# Patient Record
Sex: MALE | Race: WHITE | Employment: OTHER | URBAN - METROPOLITAN AREA
[De-identification: names, ages, dates, MRNs, and addresses within clinical notes are randomized per-mention and may not be internally consistent; named-entity substitution may affect disease eponyms.]

---

## 2018-03-03 PROCEDURE — 82607 VITAMIN B-12: CPT | Performed by: INTERNAL MEDICINE

## 2019-02-14 ENCOUNTER — HOSPITAL ENCOUNTER (OUTPATIENT)
Facility: HOSPITAL | Age: 84
Setting detail: HOSPITAL OUTPATIENT SURGERY
Discharge: HOME | End: 2019-02-14
Attending: OPHTHALMOLOGY | Admitting: OPHTHALMOLOGY
Payer: MEDICARE

## 2019-02-14 VITALS
BODY MASS INDEX: 25.41 KG/M2 | DIASTOLIC BLOOD PRESSURE: 86 MMHG | OXYGEN SATURATION: 99 % | TEMPERATURE: 98 F | SYSTOLIC BLOOD PRESSURE: 147 MMHG | RESPIRATION RATE: 14 BRPM | HEIGHT: 70 IN | HEART RATE: 60 BPM | WEIGHT: 177.5 LBS

## 2019-02-14 DIAGNOSIS — H25.811 COMBINED FORMS OF AGE-RELATED CATARACT OF RIGHT EYE: ICD-10-CM

## 2019-02-14 PROCEDURE — 08RJ3JZ REPLACEMENT OF RIGHT LENS WITH SYNTHETIC SUBSTITUTE, PERCUTANEOUS APPROACH: ICD-10-PCS | Performed by: OPHTHALMOLOGY

## 2019-02-14 RX ORDER — LIDOCAINE HYDROCHLORIDE 10 MG/ML
INJECTION, SOLUTION EPIDURAL; INFILTRATION; INTRACAUDAL; PERINEURAL AS NEEDED
Status: DISCONTINUED | OUTPATIENT
Start: 2019-02-14 | End: 2019-02-14 | Stop reason: HOSPADM

## 2019-02-14 RX ORDER — PHENYLEPHRINE HCL 2.5 %
1 DROPS OPHTHALMIC (EYE) SEE ADMIN INSTRUCTIONS
Status: COMPLETED | OUTPATIENT
Start: 2019-02-14 | End: 2019-02-14

## 2019-02-14 RX ORDER — ONDANSETRON 2 MG/ML
4 INJECTION INTRAMUSCULAR; INTRAVENOUS AS NEEDED
Status: DISCONTINUED | OUTPATIENT
Start: 2019-02-14 | End: 2019-02-14

## 2019-02-14 RX ORDER — LABETALOL HYDROCHLORIDE 5 MG/ML
5 INJECTION, SOLUTION INTRAVENOUS EVERY 5 MIN PRN
Status: DISCONTINUED | OUTPATIENT
Start: 2019-02-14 | End: 2019-02-14

## 2019-02-14 RX ORDER — BALANCED SALT SOLUTION 6.4; .75; .48; .3; 3.9; 1.7 MG/ML; MG/ML; MG/ML; MG/ML; MG/ML; MG/ML
SOLUTION OPHTHALMIC AS NEEDED
Status: DISCONTINUED | OUTPATIENT
Start: 2019-02-14 | End: 2019-02-14 | Stop reason: HOSPADM

## 2019-02-14 RX ORDER — MOXIFLOXACIN 5 MG/ML
SOLUTION/ DROPS OPHTHALMIC AS NEEDED
Status: DISCONTINUED | OUTPATIENT
Start: 2019-02-14 | End: 2019-02-14 | Stop reason: HOSPADM

## 2019-02-14 RX ORDER — ACETAMINOPHEN 500 MG
500 TABLET ORAL ONCE AS NEEDED
Status: DISCONTINUED | OUTPATIENT
Start: 2019-02-14 | End: 2019-02-14

## 2019-02-14 RX ORDER — PREDNISOLONE ACETATE 10 MG/ML
SUSPENSION/ DROPS OPHTHALMIC AS NEEDED
Status: DISCONTINUED | OUTPATIENT
Start: 2019-02-14 | End: 2019-02-14 | Stop reason: HOSPADM

## 2019-02-14 RX ORDER — NALOXONE HYDROCHLORIDE 0.4 MG/ML
80 INJECTION, SOLUTION INTRAMUSCULAR; INTRAVENOUS; SUBCUTANEOUS AS NEEDED
Status: DISCONTINUED | OUTPATIENT
Start: 2019-02-14 | End: 2019-02-14

## 2019-02-14 RX ORDER — SODIUM CHLORIDE, SODIUM LACTATE, POTASSIUM CHLORIDE, CALCIUM CHLORIDE 600; 310; 30; 20 MG/100ML; MG/100ML; MG/100ML; MG/100ML
INJECTION, SOLUTION INTRAVENOUS CONTINUOUS
Status: DISCONTINUED | OUTPATIENT
Start: 2019-02-14 | End: 2019-02-14

## 2019-02-14 RX ORDER — METOCLOPRAMIDE HYDROCHLORIDE 5 MG/ML
10 INJECTION INTRAMUSCULAR; INTRAVENOUS AS NEEDED
Status: DISCONTINUED | OUTPATIENT
Start: 2019-02-14 | End: 2019-02-14

## 2019-02-14 RX ORDER — DIPHENHYDRAMINE HYDROCHLORIDE 50 MG/ML
12.5 INJECTION INTRAMUSCULAR; INTRAVENOUS AS NEEDED
Status: DISCONTINUED | OUTPATIENT
Start: 2019-02-14 | End: 2019-02-14

## 2019-02-14 RX ORDER — TROPICAMIDE 10 MG/ML
1 SOLUTION/ DROPS OPHTHALMIC SEE ADMIN INSTRUCTIONS
Status: COMPLETED | OUTPATIENT
Start: 2019-02-14 | End: 2019-02-14

## 2019-02-14 RX ORDER — ACETAMINOPHEN 500 MG
1000 TABLET ORAL ONCE
Status: DISCONTINUED | OUTPATIENT
Start: 2019-02-14 | End: 2019-02-14

## 2019-02-14 RX ORDER — HYDROCODONE BITARTRATE AND ACETAMINOPHEN 5; 325 MG/1; MG/1
1 TABLET ORAL AS NEEDED
Status: DISCONTINUED | OUTPATIENT
Start: 2019-02-14 | End: 2019-02-14

## 2019-02-14 RX ORDER — CYCLOPENTOLATE HYDROCHLORIDE 10 MG/ML
1 SOLUTION/ DROPS OPHTHALMIC SEE ADMIN INSTRUCTIONS
Status: COMPLETED | OUTPATIENT
Start: 2019-02-14 | End: 2019-02-14

## 2019-02-14 RX ORDER — TETRACAINE HYDROCHLORIDE 5 MG/ML
1 SOLUTION OPHTHALMIC SEE ADMIN INSTRUCTIONS
Status: COMPLETED | OUTPATIENT
Start: 2019-02-14 | End: 2019-02-14

## 2019-02-14 RX ORDER — ACETAMINOPHEN 500 MG
1000 TABLET ORAL EVERY 6 HOURS PRN
COMMUNITY

## 2019-02-14 RX ORDER — HYDROCODONE BITARTRATE AND ACETAMINOPHEN 5; 325 MG/1; MG/1
2 TABLET ORAL AS NEEDED
Status: DISCONTINUED | OUTPATIENT
Start: 2019-02-14 | End: 2019-02-14

## 2019-02-14 NOTE — H&P (VIEW-ONLY)
Reviewed below H&P by Dr. Villanueva Search. No changes.     Riaz Ernandez   2/5/2019 4:00 PM   Office Visit   MRN:  [id number]   Description: 80year old male Provider: Scotty Weems MD Department: [facility] Internal Med   Scanning Cover Sheet     Click to print [facility] Current Outpatient Medications:  LOSARTAN POTASSIUM-HCTZ 100-12.5 MG Oral Tab TAKE 1 TABLET BY MOUTH ONCE DAILY Disp: 90 tablet Rfl: 1   CARVEDILOL 25 MG Oral Tab TAKE 1 TABLET BY MOUTH TWICE DAILY WITH MEALS Disp: 180 tablet Rfl: 1   ofloxacin 0.3 % Ophth CHEST: normal resp effort, clear to auscultation, no rales, rhonchi or wheezes  CVS: RRR, normal S1 and S2, no murmurs, no S3/S4, no peripheral edema  GI: soft, non-tender, non-distended, no hepatomegaly, no masses           IMPRESSION / PLAN:   Diagnoses Additional Encounter Details     Scanning Cover Sheet   All Scans   Questionnaire Details   Referral Information   [facility]m Time Out   Administrative Information   Coding Query   SmartForms     No SmartForms are associated with this patient.    Infusion Orders

## 2019-02-14 NOTE — H&P
Reviewed below H&P by Dr. Jonathan Mcknight. No changes.     Emperatriz Alejandra   2/5/2019 4:00 PM   Office Visit   MRN:  [id number]   Description: 80year old male Provider: Eric Suarez MD Department: [facility] Internal Med   Scanning Cover Sheet     Click to print [facility] Current Outpatient Medications:  LOSARTAN POTASSIUM-HCTZ 100-12.5 MG Oral Tab TAKE 1 TABLET BY MOUTH ONCE DAILY Disp: 90 tablet Rfl: 1   CARVEDILOL 25 MG Oral Tab TAKE 1 TABLET BY MOUTH TWICE DAILY WITH MEALS Disp: 180 tablet Rfl: 1   ofloxacin 0.3 % Ophth CHEST: normal resp effort, clear to auscultation, no rales, rhonchi or wheezes  CVS: RRR, normal S1 and S2, no murmurs, no S3/S4, no peripheral edema  GI: soft, non-tender, non-distended, no hepatomegaly, no masses           IMPRESSION / PLAN:   Diagnoses Additional Encounter Details     Scanning Cover Sheet   All Scans   Questionnaire Details   Referral Information   [facility] Time Out   Administrative Information   Coding Query   SmartForms     No SmartForms are associated with this patient.    Infusion Orders

## 2019-02-15 ENCOUNTER — ANESTHESIA EVENT (OUTPATIENT)
Dept: SURGERY | Facility: HOSPITAL | Age: 84
End: 2019-02-15
Payer: MEDICARE

## 2019-02-19 NOTE — OPERATIVE REPORT
[facility] SURGICAL CENTER OPERATIVE REPORT:     PATIENT NAME:  Omar Kerr    MRN:  [id number]    DATE OF OPERATION:       2/14/2019      PREOPERATIVE DIAGNOSIS:   1. Cortical Cataract, OD  2.  Nuclear Sclerotic Cataract, OD  Posterior Subcapsular Cataract, OD    PO Hydrodissection and hydrodelineation were carried out using balanced salt solution on a cannula. The phacoemulsification tip was introduced into the eye, and the nucleus was removed with a modified divide and conquer technique.  The residual cortex was mallory

## 2019-02-28 ENCOUNTER — ANESTHESIA (OUTPATIENT)
Dept: SURGERY | Facility: HOSPITAL | Age: 84
End: 2019-02-28
Payer: MEDICARE

## 2019-02-28 ENCOUNTER — HOSPITAL ENCOUNTER (OUTPATIENT)
Facility: HOSPITAL | Age: 84
Setting detail: HOSPITAL OUTPATIENT SURGERY
Discharge: HOME | End: 2019-02-28
Attending: OPHTHALMOLOGY | Admitting: OPHTHALMOLOGY
Payer: MEDICARE

## 2019-02-28 VITALS
HEIGHT: 70 IN | SYSTOLIC BLOOD PRESSURE: 142 MMHG | RESPIRATION RATE: 16 BRPM | DIASTOLIC BLOOD PRESSURE: 62 MMHG | WEIGHT: 176.56 LBS | HEART RATE: 78 BPM | TEMPERATURE: 98 F | BODY MASS INDEX: 25.28 KG/M2 | OXYGEN SATURATION: 97 %

## 2019-02-28 DIAGNOSIS — H25.812 COMBINED FORMS OF AGE-RELATED CATARACT OF LEFT EYE: ICD-10-CM

## 2019-02-28 PROCEDURE — 08RK3JZ REPLACEMENT OF LEFT LENS WITH SYNTHETIC SUBSTITUTE, PERCUTANEOUS APPROACH: ICD-10-PCS | Performed by: OPHTHALMOLOGY

## 2019-02-28 RX ORDER — ACETAMINOPHEN 500 MG
1000 TABLET ORAL ONCE
Status: DISCONTINUED | OUTPATIENT
Start: 2019-02-28 | End: 2019-02-28

## 2019-02-28 RX ORDER — CYCLOPENTOLATE HYDROCHLORIDE 10 MG/ML
1 SOLUTION/ DROPS OPHTHALMIC SEE ADMIN INSTRUCTIONS
Status: COMPLETED | OUTPATIENT
Start: 2019-02-28 | End: 2019-02-28

## 2019-02-28 RX ORDER — LIDOCAINE HYDROCHLORIDE 10 MG/ML
INJECTION, SOLUTION EPIDURAL; INFILTRATION; INTRACAUDAL; PERINEURAL AS NEEDED
Status: DISCONTINUED | OUTPATIENT
Start: 2019-02-28 | End: 2019-02-28

## 2019-02-28 RX ORDER — TROPICAMIDE 10 MG/ML
1 SOLUTION/ DROPS OPHTHALMIC SEE ADMIN INSTRUCTIONS
Status: COMPLETED | OUTPATIENT
Start: 2019-02-28 | End: 2019-02-28

## 2019-02-28 RX ORDER — PHENYLEPHRINE HCL 2.5 %
DROPS OPHTHALMIC (EYE)
Status: DISCONTINUED
Start: 2019-02-28 | End: 2019-02-28

## 2019-02-28 RX ORDER — SODIUM CHLORIDE, SODIUM LACTATE, POTASSIUM CHLORIDE, CALCIUM CHLORIDE 600; 310; 30; 20 MG/100ML; MG/100ML; MG/100ML; MG/100ML
INJECTION, SOLUTION INTRAVENOUS CONTINUOUS
Status: DISCONTINUED | OUTPATIENT
Start: 2019-02-28 | End: 2019-02-28

## 2019-02-28 RX ORDER — PREDNISOLONE ACETATE 10 MG/ML
SUSPENSION/ DROPS OPHTHALMIC AS NEEDED
Status: DISCONTINUED | OUTPATIENT
Start: 2019-02-28 | End: 2019-02-28

## 2019-02-28 RX ORDER — CYCLOPENTOLATE HYDROCHLORIDE 10 MG/ML
SOLUTION/ DROPS OPHTHALMIC
Status: DISCONTINUED
Start: 2019-02-28 | End: 2019-02-28

## 2019-02-28 RX ORDER — TETRACAINE HYDROCHLORIDE 5 MG/ML
SOLUTION OPHTHALMIC
Status: DISCONTINUED
Start: 2019-02-28 | End: 2019-02-28

## 2019-02-28 RX ORDER — IBUPROFEN 400 MG/1
400 TABLET ORAL EVERY 6 HOURS PRN
COMMUNITY

## 2019-02-28 RX ORDER — MOXIFLOXACIN 5 MG/ML
SOLUTION/ DROPS OPHTHALMIC AS NEEDED
Status: DISCONTINUED | OUTPATIENT
Start: 2019-02-28 | End: 2019-02-28

## 2019-02-28 RX ORDER — PHENYLEPHRINE HCL 2.5 %
1 DROPS OPHTHALMIC (EYE) SEE ADMIN INSTRUCTIONS
Status: COMPLETED | OUTPATIENT
Start: 2019-02-28 | End: 2019-02-28

## 2019-02-28 RX ORDER — BALANCED SALT SOLUTION 6.4; .75; .48; .3; 3.9; 1.7 MG/ML; MG/ML; MG/ML; MG/ML; MG/ML; MG/ML
SOLUTION OPHTHALMIC AS NEEDED
Status: DISCONTINUED | OUTPATIENT
Start: 2019-02-28 | End: 2019-02-28

## 2019-02-28 RX ORDER — TROPICAMIDE 10 MG/ML
SOLUTION/ DROPS OPHTHALMIC
Status: DISCONTINUED
Start: 2019-02-28 | End: 2019-02-28

## 2019-02-28 RX ORDER — TETRACAINE HYDROCHLORIDE 5 MG/ML
SOLUTION OPHTHALMIC AS NEEDED
Status: DISCONTINUED | OUTPATIENT
Start: 2019-02-28 | End: 2019-02-28

## 2019-02-28 RX ORDER — TETRACAINE HYDROCHLORIDE 5 MG/ML
1 SOLUTION OPHTHALMIC SEE ADMIN INSTRUCTIONS
Status: COMPLETED | OUTPATIENT
Start: 2019-02-28 | End: 2019-02-28

## 2019-02-28 NOTE — OPERATIVE REPORT
[facility] SURGICAL CENTER OPERATIVE REPORT:     PATIENT NAME:  Marcial Burgos    MRN:  [id number]    DATE OF OPERATION:       2/28/2019      PREOPERATIVE DIAGNOSIS:   1. Cortical Cataract, OS  2.  Nuclear Sclerotic Cataract, OS  Posterior Subcapsular Cataract, OS    PO hydrodelineation were carried out using balanced salt solution on a cannula. The phacoemulsification tip was introduced into the eye, and the nucleus was removed with a modified divide and conquer technique.  The residual cortex was removed using automated

## 2019-02-28 NOTE — ANESTHESIA POSTPROCEDURE EVALUATION
[address] Patient Status:  Hospital Outpatient Surgery   Age/Gender 80year old male MRN [id number]   [facility] SURGERY Attending Terell Lu MD   Hosp Day # 0 PCP Rosalba Mares MD       Anesthesia Post-op Note    Proc

## 2019-02-28 NOTE — ANESTHESIA PREPROCEDURE EVALUATION
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  Disp:  Rfl:    [DISCONTINUED] LOSARTAN POTASSIUM-HCTZ 100-12.5 MG Oral Tab TAKE 1 TABLET BY MOUTH ONCE DAILY Disp: 90 tablet Rfl: 1   CARVEDILOL 25 MG Oral Tab TAKE 1 TABLET BY MOUTH TWICE DAILY WITH MEALS D RDW 13.2 02/05/2019     02/05/2019     Lab Results   Component Value Date     02/05/2019    K 5.10 02/05/2019     02/05/2019    CO2 22.8 02/05/2019    BUN 44 (H) 02/05/2019    CREATSERUM 1.85 (H) 02/05/2019     (H) 02/05/2019

## 2019-02-28 NOTE — INTERVAL H&P NOTE
Pre-op Diagnosis: Combined forms of age-related cataract of left eye [H25.812]    The above referenced H&P was reviewed by Pierre Ruiz MD on 2/28/2019, the patient was examined and no significant changes have occurred in the patient's condition since th

## 2019-07-30 PROBLEM — N18.30 CKD (CHRONIC KIDNEY DISEASE) STAGE 3, GFR 30-59 ML/MIN (HCC): Status: ACTIVE | Noted: 2019-07-30

## 2019-07-30 PROBLEM — E11.9 TYPE 2 DIABETES MELLITUS, WITHOUT LONG-TERM CURRENT USE OF INSULIN (HCC): Status: ACTIVE | Noted: 2019-07-30

## 2024-02-16 ENCOUNTER — APPOINTMENT (OUTPATIENT)
Dept: MRI IMAGING | Facility: HOSPITAL | Age: 89
End: 2024-02-16
Attending: EMERGENCY MEDICINE
Payer: MEDICARE

## 2024-02-16 ENCOUNTER — APPOINTMENT (OUTPATIENT)
Dept: GENERAL RADIOLOGY | Facility: HOSPITAL | Age: 89
End: 2024-02-16
Attending: EMERGENCY MEDICINE
Payer: MEDICARE

## 2024-02-16 ENCOUNTER — HOSPITAL ENCOUNTER (EMERGENCY)
Facility: HOSPITAL | Age: 89
Discharge: HOME | End: 2024-02-16
Attending: EMERGENCY MEDICINE
Payer: MEDICARE

## 2024-02-16 VITALS
WEIGHT: 152 LBS | RESPIRATION RATE: 16 BRPM | HEART RATE: 47 BPM | TEMPERATURE: 97 F | BODY MASS INDEX: 21.76 KG/M2 | OXYGEN SATURATION: 96 % | SYSTOLIC BLOOD PRESSURE: 165 MMHG | HEIGHT: 70 IN | DIASTOLIC BLOOD PRESSURE: 60 MMHG

## 2024-02-16 DIAGNOSIS — H54.61 VISION LOSS OF RIGHT EYE: Primary | ICD-10-CM

## 2024-02-16 DIAGNOSIS — G45.9 BRAIN TIA: ICD-10-CM

## 2024-02-16 PROBLEM — E87.20 METABOLIC ACIDOSIS: Status: ACTIVE | Noted: 2024-02-16

## 2024-02-16 PROBLEM — R73.9 HYPERGLYCEMIA: Status: ACTIVE | Noted: 2024-02-16

## 2024-02-16 PROBLEM — R79.89 AZOTEMIA: Status: ACTIVE | Noted: 2024-02-16

## 2024-02-16 LAB
ALBUMIN SERPL-MCNC: 3.8 G/DL (ref 3.4–5)
ALBUMIN/GLOB SERPL: 1 {RATIO} (ref 1–2)
ALP LIVER SERPL-CCNC: 61 U/L
ALT SERPL-CCNC: 12 U/L
ANION GAP SERPL CALC-SCNC: 4 MMOL/L (ref 0–18)
AST SERPL-CCNC: 16 U/L (ref 15–37)
BASOPHILS # BLD AUTO: 0.04 X10(3) UL (ref 0–0.2)
BASOPHILS NFR BLD AUTO: 0.6 %
BILIRUB SERPL-MCNC: 0.7 MG/DL (ref 0.1–2)
BUN BLD-MCNC: 41 MG/DL (ref 9–23)
CALCIUM BLD-MCNC: 9.5 MG/DL (ref 8.5–10.1)
CHLORIDE SERPL-SCNC: 112 MMOL/L (ref 98–112)
CO2 SERPL-SCNC: 21 MMOL/L (ref 21–32)
CREAT BLD-MCNC: 1.38 MG/DL
CRP SERPL-MCNC: 3.64 MG/DL (ref ?–0.3)
EGFRCR SERPLBLD CKD-EPI 2021: 48 ML/MIN/1.73M2 (ref 60–?)
EOSINOPHIL # BLD AUTO: 0.16 X10(3) UL (ref 0–0.7)
EOSINOPHIL NFR BLD AUTO: 2.2 %
ERYTHROCYTE [DISTWIDTH] IN BLOOD BY AUTOMATED COUNT: 13 %
ERYTHROCYTE [SEDIMENTATION RATE] IN BLOOD: 40 MM/HR
GLOBULIN PLAS-MCNC: 3.9 G/DL (ref 2.8–4.4)
GLUCOSE BLD-MCNC: 113 MG/DL (ref 70–99)
HCT VFR BLD AUTO: 36 %
HGB BLD-MCNC: 12 G/DL
IMM GRANULOCYTES # BLD AUTO: 0.01 X10(3) UL (ref 0–1)
IMM GRANULOCYTES NFR BLD: 0.1 %
LYMPHOCYTES # BLD AUTO: 2.18 X10(3) UL (ref 1–4)
LYMPHOCYTES NFR BLD AUTO: 30 %
MCH RBC QN AUTO: 30.1 PG (ref 26–34)
MCHC RBC AUTO-ENTMCNC: 33.3 G/DL (ref 31–37)
MCV RBC AUTO: 90.2 FL
MONOCYTES # BLD AUTO: 0.59 X10(3) UL (ref 0.1–1)
MONOCYTES NFR BLD AUTO: 8.1 %
NEUTROPHILS # BLD AUTO: 4.29 X10 (3) UL (ref 1.5–7.7)
NEUTROPHILS # BLD AUTO: 4.29 X10(3) UL (ref 1.5–7.7)
NEUTROPHILS NFR BLD AUTO: 59 %
OSMOLALITY SERPL CALC.SUM OF ELEC: 295 MOSM/KG (ref 275–295)
PLATELET # BLD AUTO: 223 10(3)UL (ref 150–450)
POTASSIUM SERPL-SCNC: 4.4 MMOL/L (ref 3.5–5.1)
PROT SERPL-MCNC: 7.7 G/DL (ref 6.4–8.2)
RBC # BLD AUTO: 3.99 X10(6)UL
SODIUM SERPL-SCNC: 137 MMOL/L (ref 136–145)
WBC # BLD AUTO: 7.3 X10(3) UL (ref 4–11)

## 2024-02-16 PROCEDURE — A9575 INJ GADOTERATE MEGLUMI 0.1ML: HCPCS | Performed by: EMERGENCY MEDICINE

## 2024-02-16 PROCEDURE — 99285 EMERGENCY DEPT VISIT HI MDM: CPT

## 2024-02-16 PROCEDURE — 70553 MRI BRAIN STEM W/O & W/DYE: CPT | Performed by: EMERGENCY MEDICINE

## 2024-02-16 PROCEDURE — 85025 COMPLETE CBC W/AUTO DIFF WBC: CPT | Performed by: EMERGENCY MEDICINE

## 2024-02-16 PROCEDURE — 70549 MR ANGIOGRAPH NECK W/O&W/DYE: CPT | Performed by: EMERGENCY MEDICINE

## 2024-02-16 PROCEDURE — 86140 C-REACTIVE PROTEIN: CPT | Performed by: EMERGENCY MEDICINE

## 2024-02-16 PROCEDURE — 71045 X-RAY EXAM CHEST 1 VIEW: CPT | Performed by: EMERGENCY MEDICINE

## 2024-02-16 PROCEDURE — 70546 MR ANGIOGRAPH HEAD W/O&W/DYE: CPT | Performed by: EMERGENCY MEDICINE

## 2024-02-16 PROCEDURE — 93010 ELECTROCARDIOGRAM REPORT: CPT

## 2024-02-16 PROCEDURE — 80053 COMPREHEN METABOLIC PANEL: CPT | Performed by: EMERGENCY MEDICINE

## 2024-02-16 PROCEDURE — 36415 COLL VENOUS BLD VENIPUNCTURE: CPT

## 2024-02-16 PROCEDURE — 85652 RBC SED RATE AUTOMATED: CPT | Performed by: EMERGENCY MEDICINE

## 2024-02-16 PROCEDURE — 93005 ELECTROCARDIOGRAM TRACING: CPT

## 2024-02-16 PROCEDURE — 99284 EMERGENCY DEPT VISIT MOD MDM: CPT

## 2024-02-16 RX ORDER — PREDNISONE 20 MG/1
40 TABLET ORAL DAILY
Qty: 14 TABLET | Refills: 0 | Status: SHIPPED | OUTPATIENT
Start: 2024-02-16 | End: 2024-02-23

## 2024-02-16 RX ORDER — PREDNISONE 20 MG/1
60 TABLET ORAL DAILY
Qty: 15 TABLET | Refills: 0 | Status: SHIPPED | OUTPATIENT
Start: 2024-02-16 | End: 2024-02-16

## 2024-02-16 RX ORDER — PREDNISONE 20 MG/1
60 TABLET ORAL ONCE
Status: DISCONTINUED | OUTPATIENT
Start: 2024-02-16 | End: 2024-02-16

## 2024-02-16 RX ORDER — PREDNISONE 20 MG/1
40 TABLET ORAL ONCE
Status: COMPLETED | OUTPATIENT
Start: 2024-02-16 | End: 2024-02-16

## 2024-02-16 RX ORDER — GADOTERATE MEGLUMINE 376.9 MG/ML
15 INJECTION INTRAVENOUS
Status: COMPLETED | OUTPATIENT
Start: 2024-02-16 | End: 2024-02-16

## 2024-02-16 RX ORDER — CEFDINIR 300 MG/1
300 CAPSULE ORAL 2 TIMES DAILY
Qty: 14 CAPSULE | Refills: 0 | Status: SHIPPED | OUTPATIENT
Start: 2024-02-16 | End: 2024-02-23

## 2024-02-16 RX ORDER — ASPIRIN 81 MG/1
81 TABLET, CHEWABLE ORAL ONCE
Status: COMPLETED | OUTPATIENT
Start: 2024-02-16 | End: 2024-02-16

## 2024-02-16 RX ORDER — AZITHROMYCIN 250 MG/1
TABLET, FILM COATED ORAL
Qty: 6 TABLET | Refills: 0 | Status: SHIPPED | OUTPATIENT
Start: 2024-02-16 | End: 2024-02-21

## 2024-02-16 NOTE — ED INITIAL ASSESSMENT (HPI)
Son reports patient with right eye vision loss since 02/15/2024 ar 9 AM. Denies arm or leg weakness/ numbness. Sent by opthalmologist today. Patient ambulatory with cane, only speaks Sami.

## 2024-02-16 NOTE — ED PROVIDER NOTES
Patient Seen in: [facility] Emergency Department      History     Chief Complaint   Patient presents with    Stroke    Vision Problem     Stated Complaint: loss of vision on 2/15/24 he woke up fine and then about 0900 lost vision in ri*    Subjective:   HPI    This is a 89-year-old male who presents with complaints of vision loss since 9 AM on February 15, 2024.  The patient has no other complaints of trouble speaking or trouble walking the son is here they went to an ophthalmologist today who actually saw him and did want to have him to be worked up for possible stroke.  And possible retinal artery occlusion.  Patient has no complaints of headache, chest pain, abdominal pain, numbness, weakness, trouble speaking.  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 No pertinent surgical history.             Social History     Socioeconomic History    Marital status:    Tobacco Use    Smoking status: Former    Smokeless tobacco: Never   Vaping Use    Vaping Use: Never used   Substance and Sexual Activity    Alcohol use: No    Drug use: No              Review of Systems    Positive for stated complaint: loss of vision on 2/15/24 he woke up fine and then about 0900 lost vision in ri*  Other systems are as noted in HPI.  Constitutional and vital signs reviewed.      All other systems reviewed and negative except as noted above.    Physical Exam     ED Triage Vitals [02/16/24 1523]   /63   Pulse 59   Resp 16   Temp 97 °F (36.1 °C)   Temp src Temporal   SpO2 97 %   O2 Device None (Room air)       Current:BP (!) 165/60   Pulse (!) 47   Temp 97 °F (36.1 °C) (Temporal)   Resp 16   Ht 177.8 cm (5' 10\")   Wt 68.9 kg   SpO2 96%   BMI 21.81 kg/m²         Physical Exam  General: .    The patient is in no  respiratory distress. The patient is not septic or toxic    HEENT: Atraumatic, conjunctiva are not pale.  There is no icterus.  Oral mucosa Is wet. The neck is supple. There is no meningismus.  There is no facial asymmetry.  Cannot see anything out of his right eye even light is very fuzzy.  LUNGS: Clear to auscultation, there is no wheezing or retraction.  No crackles.    CV: Cardiovascular is regular without murmurs or rubs.    ABD: The abdomen is soft nondistended nontender.  There is no rebound.  There is no guarding.  Bowel sounds are present.    EXT: There is good pulses bilaterally.  There is no calf tenderness.  There is no rash noted.  There is no edema    NEURO: Alert and oriented x3.      Cranial nerves are grossly intact.   The pupils are dilated by the ophthalmologist.   Extraocular muscles are intact.      Muscle strength is 5 out of 5.    Sensory exam is grossly normal bilaterally upper and lower extremity    There is no pronator drift.    There is normal finger to nose bilaterally.      Patient is in no respiratory distress.       ED Course     Labs Reviewed   COMP METABOLIC PANEL (14) - Abnormal; Notable for the following components:       Result Value    Glucose 113 (*)     BUN 41 (*)     Creatinine 1.38 (*)     eGFR-Cr 48 (*)     ALT 12 (*)     All other components within normal limits   C-REACTIVE PROTEIN - Abnormal; Notable for the following components:    C-Reactive Protein 3.64 (*)     All other components within normal limits   SED RATE, WESTERGREN (AUTOMATED) - Abnormal; Notable for the following components:    Sed Rate 40 (*)     All other components within normal limits   CBC W/ DIFFERENTIAL - Abnormal; Notable for the following components:    HGB 12.0 (*)     HCT 36.0 (*)     All other components within normal limits   CBC WITH DIFFERENTIAL WITH PLATELET    Narrative:     The following orders were created for panel order CBC With Differential With Platelet.  Procedure                                Abnormality         Status                     ---------                               -----------         ------                     CBC W/ DIFFERENTIAL[[id number]]          Abnormal            Final result                 Please view results for these tests on the individual orders.   RAINBOW DRAW BLUE             The patient was placed on monitors, IV was started, blood was drawn.  ED Course as of 02/16/24 2129  ------------------------------------------------------------  Time: 02/16 1915  Value: C-REACTIVE PROTEIN(!): 3.64  Comment: (Reviewed)  ------------------------------------------------------------  Time: 02/16 1915  Value: SED RATE(!): 40  Comment: (Reviewed)     The patient was sent over for further evaluation.  To rule out any intracranial bleed, mass, possible retinal artery occlusion,         MDM      The EKG shows sinus bradycardia with PVCs noted.  There is no acute ST elevations or ischemic findings.  The rest of the EKG including rate rhythm axis and intervals I agree with the EKG report . The rate is 58      The patient CBC was grossly negative, comprehensive shows some mild renal insufficiency.  The patient's sed rate was 40 not significantly elevated CRP was elevated.    I personally reviewed the radiographs and my individual interpretation shows    Chest x-ray shows a left-sided pleural effusion.    Brain shows no acute bleed, tumor.  Also reviewed official report and it shows      MRI BRAIN MRA HEAD+MRA NECK (ALL W+WO) (CPT=70553/07077/75588)    Result Date: 2/16/2024  PROCEDURE:  MRI BRAIN MRA HEAD+MRA NECK (ALL W+WO) (CPT=70553/94154/55006)  COMPARISON:  None.  INDICATIONS:  eval CVA  TECHNIQUE:  MRI of the brain was performed with multi-planar T1, T2-weighted images with FLAIR sequences and diffusion weighted images without and with infusion.  MR angiography of the brain without and with infusion and MR angiography of the neck without and with infusion was performed using 3D time  of flight, multi-planar and 3D reconstructed images. All measurements obtained in this exam were performed using NASCET criteria.  PATIENT STATED HISTORY:(As transcribed by Technologist)  Patient complains of right eye vision loss since yesterday.   CONTRAST USED:  14 mL of Dotarem  FINDINGS:  MRI BRAIN INTRACRANIAL:  Exam is degraded by motion.  There is no restricted diffusion to suggest acute intracranial infarction.  There is a few scattered foci of increased FLAIR and T2 signal in the periventricular white matter both cerebral hemispheres consistent with chronic small vessel ischemic changes of aging.  No abnormal intracranial enhancement.  No mass effect.  No midline shift. VENTRICLES/SULCI:  Diffuse cerebral and cerebellar atrophy.  SINUSES/ORBITS:       Mucosal thickening in the ethmoid air cells bilaterally and maxillary sinuses.  MASTOIDS:       The mastoids are clear.  MRA BRAIN INTRACRANIAL CAROTIDS:         There is some atheromatous plaque involving the cavernous portions of internal carotid arteries bilaterally.  No significant stenosis or aneurysm. ANTERIOR CEREBRALS:         No significant stenosis or aneurysm. ANTERIOR COMMUNICATING:  No significant stenosis or aneurysm. MIDDLE CEREBRALS:         No significant stenosis or aneurysm. POSTERIOR COMMUNICATING: Not well visualized. BASILAR:             No visible stenosis or aneurysm. INTRACRANIAL VERTEBRALS: No significant stenosis or dissection.  MRA NECK COMMON CAROTID:                 No significant stenosis or dissection. CERVICAL INTERNAL CAROTID:  No significant stenosis or dissection. CERVICAL VERTEBRAL:               No significant stenosis or dissection.            CONCLUSION:  There is no MR evidence for acute intracranial infarction.  Diffuse cerebral and cerebellar atrophy with chronic microvascular ischemic changes of aging.  MRA is limited by motion.  There is no gross evidence for significant stenosis, large vessel occlusion or carotid  or vertebral artery dissection.   LOCATION:  [id number]   Dictated by (CST): Allegra Quevedo MD on 2/16/2024 at 6:52 PM     Finalized by (CST): Allegra Quevedo MD on 2/16/2024 at 6:57 PM       XR CHEST AP PORTABLE  (CPT=71045)    Result Date: 2/16/2024  PROCEDURE:  XR CHEST AP PORTABLE  (CPT=71045)  TECHNIQUE:  AP chest radiograph was obtained.  COMPARISON:  None.  INDICATIONS:  loss of vision on 2/15/24 he woke up fine and then about 0900 lost vision in right eye. Was at the Eye doctor today and sent in for stroke work up.  PATIENT STATED HISTORY: (As transcribed by Technologist)  Patient offered no additional history at this time.              CONCLUSION:  Enlargement of the cardiac silhouette.  Mild pulmonary venous congestion.  Partially loculated effusion in the left lung base with airspace disease retrocardiac left lower lobe.   LOCATION:  [id number]      Dictated by (CST): Allegra Quevedo MD on 2/16/2024 at 5:08 PM     Finalized by (CST): Allegra Quevedo MD on 2/16/2024 at 5:09 PM                 The patient CBC shows a white count of 7.3 hemoglobin 12 the patient's comprehensive 41.  Creatinine of 1.38.  The patient was given oral fluids patient was able to drink fluids.  The patient has no complaints of chest pain or shortness of breath.  He is here primarily because of the vision changes.       He is not hypoxic he is not having any chest pain.  Dissection was patient's sed rate, CRP was elevated.  Because of the elevated sed rate I discussed this case with the patient case with Dr. Rojas ophthalmology on-call I did try to call [facility] but they are not on-call.  He recommended that the patient get prednisone.  And did recommend the patient will eventually need to get a temporal artery biopsy I did feel temporal arteries on both sides he was not tender he had no jaw pain no temporal artery pain or jaw or neck pain or dizziness.  He did have good pulses there and he has no tenderness over the temporal  artery.  Discussed this case with Dr. Rojas he recommends starting on prednisone.   I talked to the family I discussed we can admit the patient.  But they feel comfortable going home but will empirically start him on antibiotics and the  MRI did show what seems to be sinusitis.,  The chest x-ray may be an early pneumonia although there is no fever no white count.  But the sed rate and CRP being elevated would be reasonable to start him on antibiotics I discussed this case with Dr. Rojas.  Also discussed this case with Dr. Perdomo who recommended patient be followed up with the TIA clinic or with our office.  Also recommend giving 1 baby aspirin.  Discussed this also this case with Dr. Neves who is covering for his physician Dr. Galarza..  The patient's case was discussed with patient's family and the patient himself patient want did not want to go to be admitted he want to go home.  He want to go home he did not want to be admitted he felt comfortable he had no chest pain shortness of breath dizziness lightheadedness 1 he was comfortable going home.  I did do a quick ultrasound a limited ultrasound of the orbits there is no findings of any obvious retinal detachment.  He is neurovascular intact on repeat exam no other focal findings.    Medical Decision Making      Disposition and Plan     Clinical Impression:  1. Vision loss of right eye    2. Brain TIA         Disposition:  Discharge  2/16/2024  7:22 pm    Follow-up:  Isa Galarza MD  [address]  [address]  [place] [address] [phone]    Follow up in 2 day(s)      Cem Rojas MD  [address] [facility] [id number] [phone]    Follow up in 2 day(s)      Susan Georges MD  [address]  [address]  [place] [id number] [phone]    Follow up in 2 day(s)      Harshil Gutierrez MD  [address]   [facility] [address]  [facility] [id number] [phone]    Follow up in 2 day(s)            Medications Prescribed:  Discharge Medication  List as of 2/16/2024  7:35 PM        START taking these medications    Details   predniSONE 20 MG Oral Tab Take 2 tablets (40 mg total) by mouth daily for 7 days., Normal, Disp-14 tablet, R-0      cefdinir 300 MG Oral Cap Take 1 capsule (300 mg total) by mouth 2 (two) times daily for 7 days., Normal, Disp-14 capsule, R-0      azithromycin (ZITHROMAX Z-LILIA) 250 MG Oral Tab 500 mg once followed by 250 mg daily x 4 days, Normal, Disp-6 tablet, R-0

## 2024-02-17 NOTE — DISCHARGE INSTRUCTIONS
You need to follow-up with ophthalmology.,  Also with general surgery to have a temporal artery biopsy.  Follow-up with your primary care physician, take 1 baby aspirin daily.      Follow-up with your primary care physician          There is some nonspecific abnormality seen on your imaging studies.  Although that is not was causing your  symptoms but these findings need to be followed up with your primary care physician  You were seen in the emergency room in a limited time.  There is a possibility that although we do not see any acute process at this present time that things can change with time.  Is therefore imperative that you follow-up with primary care physician for close follow-up.  If there is any significant progression of your pain  or other symptoms you to return immediately to the emergency room.

## 2024-02-18 LAB
ATRIAL RATE: 58 BPM
P AXIS: 2 DEGREES
P-R INTERVAL: 144 MS
Q-T INTERVAL: 454 MS
QRS DURATION: 126 MS
QTC CALCULATION (BEZET): 445 MS
R AXIS: -25 DEGREES
T AXIS: 70 DEGREES
VENTRICULAR RATE: 58 BPM

## 2024-02-19 ENCOUNTER — TELEPHONE (OUTPATIENT)
Dept: NEUROLOGY | Facility: CLINIC | Age: 89
End: 2024-02-19

## 2024-02-19 NOTE — TELEPHONE ENCOUNTER
TIA CLINIC SCREENING    1. Date of ED visit/TIA diagnosis:  02/16/2024   Time of discharge from ED:  1938    2. Is patient currently admitted?  No   If YES - [facility]IA Clinic Appointment not required.      3. Does patient already see an [facility] neurologist?  No  Name:     (if YES - TIA Clinic Appointment not required.  Route message on to patient's neurologist)    4. Patient's current anti-platelet therapy:  81mg ASA    5. Patient's current statin therapy:  none    6. Has 2D Echo with bubble test been done?  No  Date:      7. Is [facility] Clinic Appointment indicated?  Yes

## 2024-02-29 ENCOUNTER — OFFICE VISIT (OUTPATIENT)
Dept: NEUROLOGY | Facility: CLINIC | Age: 89
End: 2024-02-29
Payer: MEDICARE

## 2024-02-29 ENCOUNTER — TELEPHONE (OUTPATIENT)
Dept: NEUROLOGY | Facility: CLINIC | Age: 89
End: 2024-02-29

## 2024-02-29 VITALS
WEIGHT: 143 LBS | HEART RATE: 50 BPM | SYSTOLIC BLOOD PRESSURE: 112 MMHG | BODY MASS INDEX: 21 KG/M2 | RESPIRATION RATE: 16 BRPM | DIASTOLIC BLOOD PRESSURE: 68 MMHG

## 2024-02-29 DIAGNOSIS — H34.231 RIGHT RETINAL ARTERY BRANCH OCCLUSION: Primary | ICD-10-CM

## 2024-02-29 DIAGNOSIS — R70.0 ESR RAISED: ICD-10-CM

## 2024-02-29 PROCEDURE — 99204 OFFICE O/P NEW MOD 45 MIN: CPT | Performed by: OTHER

## 2024-02-29 RX ORDER — ASPIRIN 81 MG/1
81 TABLET ORAL DAILY
COMMUNITY

## 2024-02-29 NOTE — PROGRESS NOTES
[facility] - [place]   Neurology    Hayden Mg Patient Status:  No patient class for patient encounter    1930 MRN [id number]   Location [facility], [facility] PCP Isa Galarza MD              HPI:   Hayden Mg is a(n) 89 year old male with history of cataracts, who presents at the request of Dr. Roe for evaluation of episode of R vision loss on 2/15/24. He saw ophtho and was sent to ED. Reportedly showed changes that suggested a clot in the right eye. Patient reports suddenly the whole R eye vision went dark. This persisted but a few days later the vision in the outer right eye started coming back. Ophtho diagnosed patient with retinal artery occlusion, and patient was sent to a retinal specialist. He just recently had a laser procedure to try to correct this vision loss. ESR was elevated but MRI suggested sinusitis, so antibiotics were prescribed, as well as a course of prednisone.  Patient was started on ASA 81mg. Patient denies headache. Denies jaw claudication, temple tenderness.     Pertinent imaging and laboratory work-up:   MRI MRA head and neck 24  INTRACRANIAL CAROTIDS: There is some atheromatous plaque involving the cavernous portions of internal carotid arteries bilaterally. No significant stenosis or aneurysm.   Summary  There is no MR evidence for acute intracranial infarction. Diffuse cerebral and cerebellar atrophy with chronic microvascular ischemic changes of aging. MRA is limited by motion. There is no gross evidence for significant stenosis, large vessel occlusion or carotid or vertebral artery dissection.       A1c- 6.4      Component      Latest Ref Rng 2024   C-REACTIVE PROTEIN      <0.30 mg/dL 3.64 (H)    SED RATE      0 - 20 mm/Hr 40 (H)       Past Medical History:  Past Medical History:   Diagnosis Date    BPH (benign prostatic hyperplasia)     Cataract     Diabetes (HCC)     Herpes encephalitis (HCC) 2016    HTN  (hypertension)     Visual impairment     glasses        Past Surgical History:  History reviewed. No pertinent surgical history.    Family History:  family history includes Hypertension in his brother and mother.      Social History:   reports that he has quit smoking. He has never used smokeless tobacco. He reports that he does not drink alcohol and does not use drugs.    Allergies:  Allergies   Allergen Reactions    Lisinopril Coughing       MEDICATIONS:    Current Outpatient Medications:     losartan 100 MG Oral Tab, Take 1 tablet (100 mg total) by mouth daily., Disp: 90 tablet, Rfl: 3    metFORMIN  MG Oral Tablet 24 Hr, Take 1 tablet (500 mg total) by mouth daily with breakfast., Disp: 90 tablet, Rfl: 3    carvedilol 25 MG Oral Tab, Take 1 tablet (25 mg total) by mouth 2 (two) times daily with meals., Disp: 180 tablet, Rfl: 3    tamsulosin 0.4 MG Oral Cap, Take 1 capsule (0.4 mg total) by mouth daily., Disp: 90 capsule, Rfl: 3    pantoprazole 40 MG Oral Tab EC, Take 1 tablet (40 mg total) by mouth daily., Disp: 90 tablet, Rfl: 3    OneTouch Delica Lancets 30G Does not apply Misc, 1 lancet by Finger stick route daily. Test Blood Sugar Once Daily. Non-Insulin Dependent Diabetes Type II. E11.9., Disp: 100 each, Rfl: 3    Glucose Blood (ONETOUCH ULTRA) In Vitro Strip, Test Blood Sugar Once Daily. Non-Insulin Dependent Diabetes Type II. E11.9., Disp: 100 each, Rfl: 3    Blood Glucose Monitoring Suppl (BLOOD GLUCOSE MONITOR SYSTEM) w/Device Does not apply Kit, 1 each by Does not apply route 2 (two) times daily. Test Blood Sugar Twice Daily. Non-Insulin Dependent Diabetes Type II. E11.9., Disp: 1 kit, Rfl: 0    aspirin 81 MG Oral Tab EC, Take 1 tablet (81 mg total) by mouth daily., Disp: , Rfl:     ibuprofen 400 MG Oral Tab, Take 400 mg by mouth every 6 (six) hours as needed for Pain. (Patient not taking: Reported on 10/7/2021), Disp: , Rfl:     acetaminophen 500 MG Oral Tab, Take 1,000 mg by mouth every 6 (six)  hours as needed for Pain. (Patient not taking: Reported on 10/7/2021), Disp: , Rfl:     Cholecalciferol (VITAMIN D) 1000 UNITS Oral Tab, Take 1,000 Units by mouth daily., Disp: 30 tablet, Rfl: 0      Review of Systems:   A comprehensive 10 point review of systems was completed.     Pertinent positives and negatives noted in the HPI.         PHYSICAL EXAM:   Neurologic Exam  Vitals  Vitals:    02/29/24 0923   BP: 112/68   Pulse: 50   Resp: 16     General Appearance: Patient is a 89 year old male in no acute distress  Cardiac: Normal rate & regular rhythm  Skin: There are no rashes or other skin lesions.  Musculoskeletal: There is no scoliosis, or joint deformities  Neurologic examination:  Mental status: Patient is alert, attentive, and oriented x 3. Language is coherent and fluent without aphasia. Memory, comprehension and ability to follow commands were intact.   Cranial nerves II-XII: Optic discs were sharp. Pupils were round and reacted to light. Extraocular movements were full.  There was no face, jaw, palate or tongue weakness or atrophy. Facial sensation was normal. Hearing was grossly intact. Shoulder shrug was normal. R eye cannot count fingers in the nasal upper and lower quadrants, temporal can count  Motor exam revealed normal muscle bulk and tone. No atrophy or fasciculations. Manual muscle testing revealed MRC grade 5/5 strength throughout including proximal and distal muscles of the arms and legs.  Deep tendon reflexes were 2 at the biceps, brachioradialis, triceps, knee jerk, and ankle jerk. Plantar responses were flexor bilaterally.   Sensory exam revealed normal light touch perception. Vibratory perception and proprioception were intact at the toes. Pinprick and temperature were normal. Romberg sign was absent.  Complex motor skills revealed normal coordination. Finger-nose-finger intact.   Gait was wide based     ASSESSMENT/ACTIVE PROBLEM LIST:     Encounter Diagnoses   Name Primary?    Right  retinal artery branch occlusion Yes    ESR raised        Discussion/Plan:  R retinal artery occlusion, presumed-  Repeat ESR. For this age, 40 is not considered elevated. However, CRP, was also elevated. MRI showed sinusitis, which can elevated ESR/CRP, patient had no symptoms, but did have preceding diarrhea for a few days  Obtain records from [facility] [place] Dr. Dylan Miller, determine if any Hollenhorst plaque on their exam, or any signs that point to or away from temporal arteritis? As mentioned CRP and ESR may have been elevated due to sinus inflammation or viral GI illness if that is what he had  Reviewed MRA head and neck, which shows mild plaque in the intracranial carotid arteries, discussed mechanism, as this can certainly cause a retinal artery occlusion  Continue ASA 81mg  Check lipid panel  BP control, ideally near 120/80, and monitoring of glucose recommended  Educated regarding stroke risk factors, including recommendation of mediterranean diet, as well as importance of blood glucose control, lipid control, and hypertension management    Requested Prescriptions      No prescriptions requested or ordered in this encounter          We discussed in depth regarding the diagnosis, prognosis, treatment. The patient was given ample opportunity to ask questions. All questions and concerns were addressed.     Eneida Loera DO  Neuromuscular and General Neurology  [facility]

## 2024-02-29 NOTE — PROGRESS NOTES
Pt seen in ER 2/16 due to loss of vision. No other symptoms. Had laser surgery on eyes yesterday

## 2024-02-29 NOTE — PATIENT INSTRUCTIONS
Refill policies:    Allow 2-3 business days for refills; controlled substances may take longer.  Contact your pharmacy at least 5 days prior to running out of medication and have them send an electronic request or submit request through the “request refill” option in your [employer] account.  Refills are not addressed on weekends; covering physicians do not authorize routine medications on weekends.  No narcotics or controlled substances are refilled after noon on Fridays or by on call physicians.  By law, narcotics must be electronically prescribed.  A 30 day supply with no refills is the maximum allowed.  If your prescription is due for a refill, you may be due for a follow up appointment.  To best provide you care, patients receiving routine medications need to be seen at least once a year.  Patients receiving narcotic/controlled substance medications need to be seen at least once every 3 months.  In the event that your preferred pharmacy does not have the requested medication in stock (e.g. Backordered), it is your responsibility to find another pharmacy that has the requested medication available.  We will gladly send a new prescription to that pharmacy at your request.    Scheduling Tests:    If your physician has ordered radiology tests such as MRI or CT scans, please contact Central Scheduling at [phone] right away to schedule the test.  Once scheduled, the [facility] Centralized Referral Team will work with your insurance carrier to obtain pre-certification or prior authorization.  Depending on your insurance carrier, approval may take 3-10 days.  It is highly recommended patients assure they have received an authorization before having a test performed.  If test is done without insurance authorization, patient may be responsible for the entire amount billed.      Precertification and Prior Authorizations:  If your physician has recommended that you have a procedure or additional testing performed the [facility]  Centralized Referral Team will contact your insurance carrier to obtain pre-certification or prior authorization.    You are strongly encouraged to contact your insurance carrier to verify that your procedure/test has been approved and is a COVERED benefit.  Although the [facility] Centralized Referral Team does its due diligence, the insurance carrier gives the disclaimer that \"Although the procedure is authorized, this does not guarantee payment.\"    Ultimately the patient is responsible for payment.   Thank you for your understanding in this matter.  Paperwork Completion:  If you require FMLA or disability paperwork for your recovery, please make sure to either drop it off or have it faxed to our office at [phone]. Be sure the form has your name and date of birth on it.  The form will be faxed to our Forms Department and they will complete it for you.  There is a 25$ fee for all forms that need to be filled out.  Please be aware there is a 10-14 day turnaround time.  You will need to sign a release of information (NISREEN) form if your paperwork does not come with one.  You may call the Forms Department with any questions at [phone].  Their fax number is [phone].          Ask Dr. Dylan Miller if there were any signs of plaque with the right eye retinal occlusion? Were there any signs that would suggest temporal arteritis? Or point away from temporal arteritis?

## 2024-02-29 NOTE — TELEPHONE ENCOUNTER
Psr called [facility] as Dr Loera requesting ov notes. Pt completed NISREEN, but needed fax #. Psr spoke to Lin in medical records and Lin to fax ov notes.

## 2024-03-02 LAB
AMB EXT CHOLESTEROL, TOTAL: 168 MG/DL
AMB EXT HDL CHOLESTEROL: 48 MG/DL
AMB EXT LDL CHOLESTEROL, DIRECT: 101 MG/DL
AMB EXT TRIGLYCERIDES: 105 MG/DL
AMB EXT VLDL: 19 MG/DL

## 2024-03-04 ENCOUNTER — TELEPHONE (OUTPATIENT)
Dept: NEUROLOGY | Facility: CLINIC | Age: 89
End: 2024-03-04

## 2024-03-04 NOTE — TELEPHONE ENCOUNTER
Labs received from [facility] drawn on 3/2/24    Entered into external review    Sed rate  5 Reference 0-30    C-reactive protein  11 Reference 0-10

## 2024-03-08 ENCOUNTER — TELEPHONE (OUTPATIENT)
Dept: NEUROLOGY | Facility: CLINIC | Age: 89
End: 2024-03-08

## 2024-03-13 ENCOUNTER — TELEPHONE (OUTPATIENT)
Dept: NEUROLOGY | Facility: CLINIC | Age: 89
End: 2024-03-13

## 2024-05-30 DIAGNOSIS — H34.231 RIGHT RETINAL ARTERY BRANCH OCCLUSION: Primary | ICD-10-CM

## 2024-05-31 NOTE — TELEPHONE ENCOUNTER
Medication: ATORVASTATIN 10 MG Oral Tab      Date of last refill: 03/05/2024 (#30/2)  [place]     Last office visit: 02/29/2024  Due back to clinic per last office note:  Around 07/29/2024  Date next office visit scheduled:    Future Appointments   Date Time Provider Department Center   9/12/2024  2:35 PM Priscilla Loera DO [facility]           Last OV note recommendation:    Discussion/Plan:  R retinal artery occlusion, presumed-  Repeat ESR. For this age, 40 is not considered elevated. However, CRP, was also elevated. MRI showed sinusitis, which can elevated ESR/CRP, patient had no symptoms, but did have preceding diarrhea for a few days  Obtain records from [facility] [place] Dr. Dylan Miller, determine if any Hollenhorst plaque on their exam, or any signs that point to or away from temporal arteritis? As mentioned CRP and ESR may have been elevated due to sinus inflammation or viral GI illness if that is what he had  Reviewed MRA head and neck, which shows mild plaque in the intracranial carotid arteries, discussed mechanism, as this can certainly cause a retinal artery occlusion  Continue ASA 81mg  Check lipid panel  BP control, ideally near 120/80, and monitoring of glucose recommended  Educated regarding stroke risk factors, including recommendation of mediterranean diet, as well as importance of blood glucose control, lipid control, and hypertension management

## 2024-06-04 RX ORDER — ATORVASTATIN CALCIUM 10 MG/1
10 TABLET, FILM COATED ORAL NIGHTLY
Qty: 30 TABLET | Refills: 2 | Status: SHIPPED | OUTPATIENT
Start: 2024-06-04

## 2024-06-16 ENCOUNTER — HOSPITAL ENCOUNTER (INPATIENT)
Facility: HOSPITAL | Age: 89
LOS: 3 days | Discharge: HOME | DRG: 178 | End: 2024-06-19
Attending: EMERGENCY MEDICINE | Admitting: HOSPITALIST

## 2024-06-16 ENCOUNTER — APPOINTMENT (OUTPATIENT)
Dept: GENERAL RADIOLOGY | Facility: HOSPITAL | Age: 89
DRG: 178 | End: 2024-06-16

## 2024-06-16 DIAGNOSIS — R79.89 ELEVATED TROPONIN: ICD-10-CM

## 2024-06-16 DIAGNOSIS — R09.02 HYPOXIA: ICD-10-CM

## 2024-06-16 DIAGNOSIS — H34.231 RIGHT RETINAL ARTERY BRANCH OCCLUSION: ICD-10-CM

## 2024-06-16 DIAGNOSIS — U07.1 COVID-19: Primary | ICD-10-CM

## 2024-06-16 LAB
ALBUMIN SERPL-MCNC: 3.8 G/DL (ref 3.4–5)
ALBUMIN/GLOB SERPL: 1.1 {RATIO} (ref 1–2)
ALP LIVER SERPL-CCNC: 51 U/L
ALT SERPL-CCNC: 20 U/L
ANION GAP SERPL CALC-SCNC: 7 MMOL/L (ref 0–18)
AST SERPL-CCNC: 15 U/L (ref 15–37)
BASOPHILS # BLD AUTO: 0.03 X10(3) UL (ref 0–0.2)
BASOPHILS NFR BLD AUTO: 0.3 %
BILIRUB SERPL-MCNC: 1.2 MG/DL (ref 0.1–2)
BILIRUB UR QL STRIP.AUTO: NEGATIVE
BUN BLD-MCNC: 31 MG/DL (ref 9–23)
CALCIUM BLD-MCNC: 9.2 MG/DL (ref 8.5–10.1)
CHLORIDE SERPL-SCNC: 110 MMOL/L (ref 98–112)
CHOLEST SERPL-MCNC: 95 MG/DL (ref ?–200)
CLARITY UR REFRACT.AUTO: CLEAR
CO2 SERPL-SCNC: 19 MMOL/L (ref 21–32)
COLOR UR AUTO: YELLOW
CREAT BLD-MCNC: 1.27 MG/DL
CREAT BLD-MCNC: 1.34 MG/DL
EGFRCR SERPLBLD CKD-EPI 2021: 49 ML/MIN/1.73M2 (ref 60–?)
EGFRCR SERPLBLD CKD-EPI 2021: 53 ML/MIN/1.73M2 (ref 60–?)
EOSINOPHIL # BLD AUTO: 0 X10(3) UL (ref 0–0.7)
EOSINOPHIL NFR BLD AUTO: 0 %
ERYTHROCYTE [DISTWIDTH] IN BLOOD BY AUTOMATED COUNT: 13.7 %
FLUAV + FLUBV RNA SPEC NAA+PROBE: NEGATIVE
FLUAV + FLUBV RNA SPEC NAA+PROBE: NEGATIVE
GLOBULIN PLAS-MCNC: 3.6 G/DL (ref 2.8–4.4)
GLUCOSE BLD-MCNC: 155 MG/DL (ref 70–99)
GLUCOSE BLD-MCNC: 201 MG/DL (ref 70–99)
GLUCOSE UR STRIP.AUTO-MCNC: NORMAL MG/DL
HCT VFR BLD AUTO: 32.2 %
HDLC SERPL-MCNC: 51 MG/DL (ref 40–59)
HGB BLD-MCNC: 10.6 G/DL
IMM GRANULOCYTES # BLD AUTO: 0.02 X10(3) UL (ref 0–1)
IMM GRANULOCYTES NFR BLD: 0.2 %
KETONES UR STRIP.AUTO-MCNC: NEGATIVE MG/DL
LDLC SERPL CALC-MCNC: 31 MG/DL (ref ?–100)
LEUKOCYTE ESTERASE UR QL STRIP.AUTO: NEGATIVE
LYMPHOCYTES # BLD AUTO: 0.49 X10(3) UL (ref 1–4)
LYMPHOCYTES NFR BLD AUTO: 5.4 %
MCH RBC QN AUTO: 29.6 PG (ref 26–34)
MCHC RBC AUTO-ENTMCNC: 32.9 G/DL (ref 31–37)
MCV RBC AUTO: 89.9 FL
MONOCYTES # BLD AUTO: 0.6 X10(3) UL (ref 0.1–1)
MONOCYTES NFR BLD AUTO: 6.6 %
NEUTROPHILS # BLD AUTO: 7.97 X10 (3) UL (ref 1.5–7.7)
NEUTROPHILS # BLD AUTO: 7.97 X10(3) UL (ref 1.5–7.7)
NEUTROPHILS NFR BLD AUTO: 87.5 %
NITRITE UR QL STRIP.AUTO: NEGATIVE
NONHDLC SERPL-MCNC: 44 MG/DL (ref ?–130)
NT-PROBNP SERPL-MCNC: ABNORMAL PG/ML (ref ?–450)
OSMOLALITY SERPL CALC.SUM OF ELEC: 292 MOSM/KG (ref 275–295)
PH UR STRIP.AUTO: 5.5 [PH] (ref 5–8)
PLATELET # BLD AUTO: 160 10(3)UL (ref 150–450)
POTASSIUM SERPL-SCNC: 4.5 MMOL/L (ref 3.5–5.1)
PROCALCITONIN SERPL-MCNC: <0.05 NG/ML (ref ?–0.16)
PROT SERPL-MCNC: 7.4 G/DL (ref 6.4–8.2)
PROT UR STRIP.AUTO-MCNC: 100 MG/DL
RBC # BLD AUTO: 3.58 X10(6)UL
RSV RNA SPEC NAA+PROBE: NEGATIVE
SARS-COV-2 RNA RESP QL NAA+PROBE: DETECTED
SODIUM SERPL-SCNC: 136 MMOL/L (ref 136–145)
SP GR UR STRIP.AUTO: 1.02 (ref 1–1.03)
TRIGL SERPL-MCNC: 55 MG/DL (ref 30–149)
TROPONIN I SERPL HS-MCNC: 114 NG/L
TROPONIN I SERPL HS-MCNC: 83 NG/L
UROBILINOGEN UR STRIP.AUTO-MCNC: NORMAL MG/DL
VLDLC SERPL CALC-MCNC: 7 MG/DL (ref 0–30)
WBC # BLD AUTO: 9.1 X10(3) UL (ref 4–11)

## 2024-06-16 PROCEDURE — 80053 COMPREHEN METABOLIC PANEL: CPT | Performed by: EMERGENCY MEDICINE

## 2024-06-16 PROCEDURE — 93010 ELECTROCARDIOGRAM REPORT: CPT

## 2024-06-16 PROCEDURE — 82565 ASSAY OF CREATININE: CPT | Performed by: EMERGENCY MEDICINE

## 2024-06-16 PROCEDURE — 94640 AIRWAY INHALATION TREATMENT: CPT

## 2024-06-16 PROCEDURE — 93005 ELECTROCARDIOGRAM TRACING: CPT

## 2024-06-16 PROCEDURE — 0241U SARS-COV-2/FLU A AND B/RSV BY PCR (GENEXPERT): CPT

## 2024-06-16 PROCEDURE — 0241U SARS-COV-2/FLU A AND B/RSV BY PCR (GENEXPERT): CPT | Performed by: EMERGENCY MEDICINE

## 2024-06-16 PROCEDURE — 71045 X-RAY EXAM CHEST 1 VIEW: CPT

## 2024-06-16 PROCEDURE — 84484 ASSAY OF TROPONIN QUANT: CPT | Performed by: EMERGENCY MEDICINE

## 2024-06-16 PROCEDURE — 85025 COMPLETE CBC W/AUTO DIFF WBC: CPT

## 2024-06-16 PROCEDURE — 84484 ASSAY OF TROPONIN QUANT: CPT | Performed by: HOSPITALIST

## 2024-06-16 PROCEDURE — 80053 COMPREHEN METABOLIC PANEL: CPT

## 2024-06-16 PROCEDURE — 84145 PROCALCITONIN (PCT): CPT | Performed by: EMERGENCY MEDICINE

## 2024-06-16 PROCEDURE — 82962 GLUCOSE BLOOD TEST: CPT

## 2024-06-16 PROCEDURE — 87040 BLOOD CULTURE FOR BACTERIA: CPT | Performed by: EMERGENCY MEDICINE

## 2024-06-16 PROCEDURE — 99285 EMERGENCY DEPT VISIT HI MDM: CPT

## 2024-06-16 PROCEDURE — 83880 ASSAY OF NATRIURETIC PEPTIDE: CPT | Performed by: EMERGENCY MEDICINE

## 2024-06-16 PROCEDURE — 85025 COMPLETE CBC W/AUTO DIFF WBC: CPT | Performed by: EMERGENCY MEDICINE

## 2024-06-16 PROCEDURE — 36415 COLL VENOUS BLD VENIPUNCTURE: CPT

## 2024-06-16 PROCEDURE — 81001 URINALYSIS AUTO W/SCOPE: CPT | Performed by: EMERGENCY MEDICINE

## 2024-06-16 PROCEDURE — 80061 LIPID PANEL: CPT | Performed by: EMERGENCY MEDICINE

## 2024-06-16 RX ORDER — ASPIRIN 81 MG/1
81 TABLET ORAL DAILY
Status: DISCONTINUED | OUTPATIENT
Start: 2024-06-17 | End: 2024-06-19

## 2024-06-16 RX ORDER — SENNOSIDES 8.6 MG
17.2 TABLET ORAL NIGHTLY PRN
Status: DISCONTINUED | OUTPATIENT
Start: 2024-06-16 | End: 2024-06-19

## 2024-06-16 RX ORDER — NICOTINE POLACRILEX 4 MG
15 LOZENGE BUCCAL
Status: DISCONTINUED | OUTPATIENT
Start: 2024-06-16 | End: 2024-06-19

## 2024-06-16 RX ORDER — IPRATROPIUM BROMIDE AND ALBUTEROL SULFATE 2.5; .5 MG/3ML; MG/3ML
3 SOLUTION RESPIRATORY (INHALATION) ONCE
Status: COMPLETED | OUTPATIENT
Start: 2024-06-16 | End: 2024-06-16

## 2024-06-16 RX ORDER — NICOTINE POLACRILEX 4 MG
30 LOZENGE BUCCAL
Status: DISCONTINUED | OUTPATIENT
Start: 2024-06-16 | End: 2024-06-19

## 2024-06-16 RX ORDER — AZITHROMYCIN 250 MG/1
500 TABLET, FILM COATED ORAL
Status: DISCONTINUED | OUTPATIENT
Start: 2024-06-16 | End: 2024-06-18

## 2024-06-16 RX ORDER — MELATONIN
1000 DAILY
Status: DISCONTINUED | OUTPATIENT
Start: 2024-06-17 | End: 2024-06-19

## 2024-06-16 RX ORDER — POLYETHYLENE GLYCOL 3350 17 G/17G
17 POWDER, FOR SOLUTION ORAL DAILY PRN
Status: DISCONTINUED | OUTPATIENT
Start: 2024-06-16 | End: 2024-06-19

## 2024-06-16 RX ORDER — SODIUM CHLORIDE 9 MG/ML
125 INJECTION, SOLUTION INTRAVENOUS CONTINUOUS
Status: DISCONTINUED | OUTPATIENT
Start: 2024-06-16 | End: 2024-06-16

## 2024-06-16 RX ORDER — ACETAMINOPHEN 500 MG
1000 TABLET ORAL ONCE
Status: COMPLETED | OUTPATIENT
Start: 2024-06-16 | End: 2024-06-16

## 2024-06-16 RX ORDER — METOCLOPRAMIDE HYDROCHLORIDE 5 MG/ML
5 INJECTION INTRAMUSCULAR; INTRAVENOUS EVERY 8 HOURS PRN
Status: DISCONTINUED | OUTPATIENT
Start: 2024-06-16 | End: 2024-06-19

## 2024-06-16 RX ORDER — BISACODYL 10 MG
10 SUPPOSITORY, RECTAL RECTAL
Status: DISCONTINUED | OUTPATIENT
Start: 2024-06-16 | End: 2024-06-19

## 2024-06-16 RX ORDER — TAMSULOSIN HYDROCHLORIDE 0.4 MG/1
0.4 CAPSULE ORAL DAILY
Status: DISCONTINUED | OUTPATIENT
Start: 2024-06-17 | End: 2024-06-19

## 2024-06-16 RX ORDER — ENOXAPARIN SODIUM 100 MG/ML
40 INJECTION SUBCUTANEOUS DAILY
Status: DISCONTINUED | OUTPATIENT
Start: 2024-06-17 | End: 2024-06-19

## 2024-06-16 RX ORDER — DEXTROSE MONOHYDRATE 25 G/50ML
50 INJECTION, SOLUTION INTRAVENOUS
Status: DISCONTINUED | OUTPATIENT
Start: 2024-06-16 | End: 2024-06-19

## 2024-06-16 RX ORDER — ENEMA 19; 7 G/133ML; G/133ML
1 ENEMA RECTAL ONCE AS NEEDED
Status: DISCONTINUED | OUTPATIENT
Start: 2024-06-16 | End: 2024-06-19

## 2024-06-16 RX ORDER — LACTOBACILLUS ACIDOPHILUS 500MM CELL
1 CAPSULE ORAL DAILY
Status: DISCONTINUED | OUTPATIENT
Start: 2024-06-16 | End: 2024-06-19

## 2024-06-16 RX ORDER — CARVEDILOL 12.5 MG/1
25 TABLET ORAL 2 TIMES DAILY WITH MEALS
Status: DISCONTINUED | OUTPATIENT
Start: 2024-06-17 | End: 2024-06-17

## 2024-06-16 RX ORDER — ONDANSETRON 2 MG/ML
4 INJECTION INTRAMUSCULAR; INTRAVENOUS EVERY 6 HOURS PRN
Status: DISCONTINUED | OUTPATIENT
Start: 2024-06-16 | End: 2024-06-19

## 2024-06-16 RX ORDER — ACETAMINOPHEN 500 MG
500 TABLET ORAL EVERY 4 HOURS PRN
Status: DISCONTINUED | OUTPATIENT
Start: 2024-06-16 | End: 2024-06-19

## 2024-06-16 NOTE — H&P
.CC:   Chief Complaint   Patient presents with    Weakness    Cough/URI        PCP: Isa Galraza MD    History of Present Illness: Patient is a 89 year old male with PMH sig for DM, HTN who presented with cough, weakness alejandro over past few days. No fevers. Not eating as much and feeling weak when trying to get up. No falls. Cough has been ongoing since about 3-4 weeks ago. +sick contact with grandson at home over the past few days. No problems with bowel/bladder.   Temp 100.7 here.    PMH  Past Medical History:    BPH (benign prostatic hyperplasia)    Cataract    Diabetes (HCC)    Herpes encephalitis (HCC)    HTN (hypertension)    Visual impairment    glasses        PSH  History reviewed. No pertinent surgical history.     ALL:  Allergies   Allergen Reactions    Lisinopril Coughing        Home Medications:  No outpatient medications have been marked as taking for the 6/16/24 encounter (Hospital Encounter).         Soc Hx  Social History     Tobacco Use    Smoking status: Former    Smokeless tobacco: Never   Substance Use Topics    Alcohol use: No        Fam Hx  Family History   Problem Relation Age of Onset    Hypertension Mother     Hypertension Brother        Review of Systems  Comprehensive ROS reviewed and negative except for what's stated above.       OBJECTIVE:  /81   Pulse 71   Temp 100 °F (37.8 °C) (Temporal)   Resp 26   Wt 154 lb (69.9 kg)   SpO2 95%   BMI 22.10 kg/m²     Gen- NAD, somnolent but awakens and becomes responsive, hard of hearing  HEENT- NCAT, anicteric sclera, MMM, OP clear  Lymph- no cervical LAD  CV- RRR no murmurs. No XOCHITL  Lungs- CTAB, good respiratory effort  Abd- soft, ntnd, no organomegaly, BS+        Diagnostic Data:    CBC/Chem  Recent Labs   Lab 06/16/24  1540   WBC 9.1   HGB 10.6*   MCV 89.9   .0       Recent Labs   Lab 06/16/24  1540      K 4.5      CO2 19.0*   BUN 31*   CREATSERUM 1.34*   *   CA 9.2       Recent Labs   Lab 06/16/24  1540    ALT 20   AST 15   ALB 3.8       No results for input(s): \"TROP\" in the last 168 hours.    Additional Diagnostics: personally reviewed EKG- unclear rhythm, p waves visible but irregular and not in predictable pattern with qrs    CXR: image personally reviewed- L pleural effusion with patchy airspace disease, seen somewhat on previous xray 2/24    Radiology: XR CHEST AP PORTABLE  (CPT=71045)    Result Date: 6/16/2024  PROCEDURE:  XR CHEST AP PORTABLE  (CPT=71045)  TECHNIQUE:  AP chest radiograph was obtained.  COMPARISON:  [facility] , XR, XR CHEST AP PORTABLE  (CPT=71045), 2/16/2024, 4:11 PM.  INDICATIONS:  Weakness, fatigue, ams x4 days  PATIENT STATED HISTORY: (As transcribed by Technologist)  Patient offered no additional history at this time.    FINDINGS:  There is patchy airspace disease at the left lung base with a small left pleural effusion.  This could represent scarring, atelectasis, or pneumonia.  There is stable cardiomegaly.            CONCLUSION:  Patchy airspace disease slightly worse than 2/16/2024 could represent pneumonia superimposed on scarring or atelectasis.  There is a small to moderate left pleural effusion.   LOCATION:  [facility]      Dictated by (CST): Pablo Mares MD on 6/16/2024 at 3:47 PM     Finalized by (CST): Pablo Mares MD on 6/16/2024 at 3:48 PM          Available outpatient records reviewed--  Echo 6/12- nl ef, pasp 60    ASSESSMENT / PLAN:   89 yo man with h/o chfpef, dm, ckd3, htn who presented with sob, cough, weakness. +covid and ?pna (seems less likely), also noted to have irregular cardiac rhythm.     Dyspnea/cough/weakness  - likely 2/2 +covid  +/- PNA  - paxlovid  - procal neg, but will give ctx/azithro for now given patchy area left lower lung and while awaiting blood cultures. Consider ID consult  - >90 % on RA although currently has 2L placed, cont to trend.   - pt consult    Elevated trop/bnp  Arrhythmia, ?PACs v block, possibly reactive to lung issues  - echo done  recently  - cards consult  - trend trop    Chronic hfpef  - seems relatively euvolemic-slightly dry. Ivf given in ER    Dm- ssi, accuchecks    Ckd3- Cr stable, trend    Htn/hl- hold arb for now, give coreg. May need to hold statin while on paxlovid    Oleg, alexey Atwood MD  [facility] Hospitalist  Pager [phone]  Answering Service number: [phone]

## 2024-06-16 NOTE — ED QUICK NOTES
Orders for admission, patient is aware of plan and ready to go upstairs. Any questions, please call ED RN Mary SHARMA at extension [phone].     Patient Covid vaccination status: Fully vaccinated     COVID Test Ordered in ED: SARS-CoV-2/Flu A and B/RSV by PCR (GeneXpert)    COVID Suspicion at Admission: N/A    Running Infusions:    sodium chloride      None    Mental Status/LOC at time of transport: Aox4: Koi and Sami SPEAKING ONLY    Other pertinent information: Family at the bedside able to translate; NC 2L 02; nebs in ED    CIWA score: N/A   NIH score:  N/A

## 2024-06-16 NOTE — ED INITIAL ASSESSMENT (HPI)
Patient is Lebanese speaking only, granddaughter reports patient with cough, abdominal pain, generalized weakness x 1 week, worse the last 4 days. Denies fever at home. No confusion, patient is \"very sharp\" per granddaughter. Seen by primary last week and had normal EKG. Denies dysuria.

## 2024-06-16 NOTE — ED PROVIDER NOTES
Patient Seen in: [facility] Emergency Department      History     Chief Complaint   Patient presents with    Weakness    Cough/URI     Stated Complaint: Weakness, fatigue, ams x4 days    Subjective:   HPI    Patient is a 89-year-old male with history including hypertension, high cholesterol, diabetes presenting for evaluation of cough, exertional dyspnea and weakness.  Cough started about 4 days, mostly nonproductive and he has not noticed fevers.  However over the last few days he has had progressively worsening exertional dyspnea, to the point where per family today he can only take 3-4 steps before he had to sit down to rest and was huffing and puffing.  No chest pain or tightness.  Does not have a history of asthma or COPD.  Complains intermittently of periumbilical abdominal pain as well.  Patient primarily speaks Macanese but family is at bedside to help with history.  At baseline he lives with a family member but per the family is very active, going outside for walks and gardening independently.    Objective:   Past Medical History:    BPH (benign prostatic hyperplasia)    Cataract    Diabetes (HCC)    Herpes encephalitis (HCC)    HTN (hypertension)    Visual impairment    glasses              History reviewed. No pertinent surgical history.             Social History     Socioeconomic History    Marital status:    Tobacco Use    Smoking status: Former    Smokeless tobacco: Never   Vaping Use    Vaping status: Never Used   Substance and Sexual Activity    Alcohol use: No    Drug use: No   Other Topics Concern    Caffeine Concern Yes     Comment: 1 cup daily    Exercise Yes     Comment: small walking              Review of Systems    Positive for stated complaint: Weakness, fatigue, ams x4 days  Other systems are as noted in HPI.  Constitutional and vital signs reviewed.      All other systems reviewed and negative except as noted above.    Physical Exam     ED Triage Vitals [06/16/24 1523]   BP  160/67   Pulse 67   Resp 24   Temp 100 °F (37.8 °C)   Temp src Temporal   SpO2 94 %   O2 Device None (Room air)       Current Vitals:   Vital Signs  BP: 142/81  Pulse: 71  Resp: 26  Temp: 100 °F (37.8 °C)  Temp src: Temporal  MAP (mmHg): 96    Oxygen Therapy  SpO2: 95 %  O2 Device: Nasal cannula  O2 Flow Rate (L/min): 2 L/min            Physical Exam  Vitals and nursing note reviewed.   Constitutional:       Appearance: He is well-developed.   HENT:      Head: Normocephalic and atraumatic.   Eyes:      Conjunctiva/sclera: Conjunctivae normal.      Pupils: Pupils are equal, round, and reactive to light.   Cardiovascular:      Rate and Rhythm: Normal rate and regular rhythm.      Heart sounds: Normal heart sounds.   Pulmonary:      Effort: Pulmonary effort is normal.      Breath sounds: Normal breath sounds.      Comments: Mild tachypnea although he is able to speak full sentences.  Rhonchi bilaterally, more pronounced on the left.  Aeration is fair.  Abdominal:      General: Bowel sounds are normal.      Palpations: Abdomen is soft.      Comments: Minimal periumbilical tenderness.  Nondistended.  No rebound or guarding.   Musculoskeletal:         General: Normal range of motion.      Comments: Trace edema bilateral lower extremities.   Skin:     General: Skin is warm and dry.   Neurological:      Mental Status: He is alert and oriented to person, place, and time.               ED Course     Labs Reviewed   COMP METABOLIC PANEL (14) - Abnormal; Notable for the following components:       Result Value    Glucose 155 (*)     CO2 19.0 (*)     BUN 31 (*)     Creatinine 1.34 (*)     eGFR-Cr 49 (*)     All other components within normal limits   URINALYSIS WITH CULTURE REFLEX - Abnormal; Notable for the following components:    Blood Urine 1+ (*)     Protein Urine 100 (*)     Squamous Epi. Cells Few (*)     All other components within normal limits   PRO BETA NATRIURETIC PEPTIDE - Abnormal; Notable for the following  components:    Pro-Beta Natriuretic Peptide 12,340 (*)     All other components within normal limits   TROPONIN I HIGH SENSITIVITY - Abnormal; Notable for the following components:    Troponin I (High Sensitivity) 83 (*)     All other components within normal limits   SARS-COV-2/FLU A AND B/RSV BY PCR (GENEXPERT) - Abnormal; Notable for the following components:    SARS-CoV-2 (COVID-19) - (GeneXpert) Detected (*)     All other components within normal limits    Narrative:     This test is intended for the qualitative detection and differentiation of SARS-CoV-2, influenza A, influenza B, and respiratory syncytial virus (RSV) viral RNA in nasopharyngeal or nares swabs from individuals suspected of respiratory viral infection consistent with COVID-19 by their healthcare provider. Signs and symptoms of respiratory viral infection due to SARS-CoV-2, influenza, and RSV can be similar.    Test performed using the Xpert Xpress SARS-CoV-2/FLU/RSV (real time RT-PCR)  assay on the [employer] instrument, [employer], [place] [address].   This test is being used under the Food and Drug Administration's Emergency Use Authorization.    The authorized Fact Sheet for Healthcare Providers for this assay is available upon request from the laboratory.   CBC W/ DIFFERENTIAL - Abnormal; Notable for the following components:    RBC 3.58 (*)     HGB 10.6 (*)     HCT 32.2 (*)     Neutrophil Absolute Prelim 7.97 (*)     Neutrophil Absolute 7.97 (*)     Lymphocyte Absolute 0.49 (*)     All other components within normal limits   CBC WITH DIFFERENTIAL WITH PLATELET    Narrative:     The following orders were created for panel order CBC With Differential With Platelet.  Procedure                               Abnormality         Status                     ---------                               -----------         ------                     CBC W/ DIFFERENTIAL[[id number]]          Abnormal            Final result                 Please view results  for these tests on the individual orders.   LIPID PANEL   PROCALCITONIN   CREATININE, SERUM   RAINBOW DRAW LAVENDER   RAINBOW DRAW LIGHT GREEN   RAINBOW DRAW BLUE   BLOOD CULTURE   BLOOD CULTURE     EKG    Rate, intervals and axes as noted on EKG Report.  Rate: 68  Rhythm: Atrial Fibrillation  Reading: To be irregularly irregular rhythm consistent with rate controlled atrial fibrillation.  Left bundle branch block.  Atrial fibrillation appears new compared to prior EKG from February 2024.  Some baseline artifact.  Will get repeat EKG.    Repeat EKG performed at 1639.  EKG    Rate, intervals and axes as noted on EKG Report.  Rate: 83  Rhythm: Sinus Rhythm  Reading: Sinus rhythm that appears to be most consistent with type I second-degree block at least 1 dropped QRS complex.  Primarily 2-1 conduction.                   XR CHEST AP PORTABLE  (CPT=71045)    Result Date: 6/16/2024  PROCEDURE:  XR CHEST AP PORTABLE  (CPT=71045)  TECHNIQUE:  AP chest radiograph was obtained.  COMPARISON:  DARI[facility] , XR, XR CHEST AP PORTABLE  (CPT=71045), 2/16/2024, 4:11 PM.  INDICATIONS:  Weakness, fatigue, ams x4 days  PATIENT STATED HISTORY: (As transcribed by Technologist)  Patient offered no additional history at this time.    FINDINGS:  There is patchy airspace disease at the left lung base with a small left pleural effusion.  This could represent scarring, atelectasis, or pneumonia.  There is stable cardiomegaly.            CONCLUSION:  Patchy airspace disease slightly worse than 2/16/2024 could represent pneumonia superimposed on scarring or atelectasis.  There is a small to moderate left pleural effusion.   LOCATION:  [facility]      Dictated by (CST): Pablo Mares MD on 6/16/2024 at 3:47 PM     Finalized by (CST): Pablo Mares MD on 6/16/2024 at 3:48 PM               [facility]      89-year-old male presenting for evaluation of cough for 4 days, now with progressive weakness and exertional dyspnea.  He is receiving nebulizer treatment as I  enter the room.  He is awake and alert, bilateral rhonchi worse on the left, aeration is fair.  Was reportedly hypoxic at 91% on room air on arrival.  Differential includes COVID, flu, pneumonia, volume overload.  Labs ordered.  Admission disposition: 6/16/2024  5:26 PM           Update at 5 PM.  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  Discussed with the family.  He did had COVID back at the beginning of the pandemic and the initial 2 vaccines, nothing since.  No known sick or COVID contacts.  There is possible left lower lobe infiltrate with effusion on chest x-ray, unclear if this is bacterial pneumonia or just from his COVID.  He appears comfortable at rest but I do think he should be admitted for further treatment.  Will discuss with the hospitalist.  Additionally his troponin is mildly elevated.  Cardiology will see him in the hospital.      Update at 5:20 PM.  Discussed the case with Dr. Atwood the hospitalist.  Will prescribe a dose of Paxlovid for now.  Procalcitonin added onto labs, will hold off on antibiotics at least until that result is back.        Past Medical History-hypertension, diabetes, high cholesterol    Differential diagnosis before testing included pneumonia, COVID, flu, volume overload    Co-morbidities that add to the complexity of management include: None    Testing ordered during this visit included labs, GEN expert, chest x-ray    Radiographic images  I personally reviewed the radiographs and my individual interpretation shows patchy left lower infiltrate with effusion   I also reviewed the official reports that showed patchy left lower lobe infiltrate with effusion      History obtained by an independent source included from family at bedside    Discussion of management with hospitalist, cardiology      Medications Provided: Paxlovid, DuoNeb            Disposition:    Admission  I have discussed with the patient the results of test, differential diagnosis, and treatment plan. They expressed  clear understanding of these instructions and agrees to the plan provided.                                Medical Decision Making      Disposition and Plan     Clinical Impression:  1. COVID-19    2. Hypoxia    3. Elevated troponin         Disposition:  Admit  6/16/2024  5:26 pm    Follow-up:  No follow-up provider specified.        Medications Prescribed:  Current Discharge Medication List                            Hospital Problems       Present on Admission  Date Reviewed: 2/29/2024            ICD-10-CM Noted POA    * (Principal) COVID-19 U07.1 6/16/2024 Unknown

## 2024-06-17 LAB
ANION GAP SERPL CALC-SCNC: 8 MMOL/L (ref 0–18)
ATRIAL RATE: 83 BPM
BASOPHILS # BLD AUTO: 0.02 X10(3) UL (ref 0–0.2)
BASOPHILS NFR BLD AUTO: 0.4 %
BUN BLD-MCNC: 33 MG/DL (ref 9–23)
CALCIUM BLD-MCNC: 8.6 MG/DL (ref 8.5–10.1)
CHLORIDE SERPL-SCNC: 113 MMOL/L (ref 98–112)
CO2 SERPL-SCNC: 17 MMOL/L (ref 21–32)
CREAT BLD-MCNC: 1.13 MG/DL
EGFRCR SERPLBLD CKD-EPI 2021: 61 ML/MIN/1.73M2 (ref 60–?)
EOSINOPHIL # BLD AUTO: 0 X10(3) UL (ref 0–0.7)
EOSINOPHIL NFR BLD AUTO: 0 %
ERYTHROCYTE [DISTWIDTH] IN BLOOD BY AUTOMATED COUNT: 13.9 %
GLUCOSE BLD-MCNC: 117 MG/DL (ref 70–99)
GLUCOSE BLD-MCNC: 130 MG/DL (ref 70–99)
GLUCOSE BLD-MCNC: 131 MG/DL (ref 70–99)
GLUCOSE BLD-MCNC: 156 MG/DL (ref 70–99)
GLUCOSE BLD-MCNC: 163 MG/DL (ref 70–99)
HCT VFR BLD AUTO: 27.1 %
HGB BLD-MCNC: 9.1 G/DL
IMM GRANULOCYTES # BLD AUTO: 0.03 X10(3) UL (ref 0–1)
IMM GRANULOCYTES NFR BLD: 0.5 %
LYMPHOCYTES # BLD AUTO: 0.54 X10(3) UL (ref 1–4)
LYMPHOCYTES NFR BLD AUTO: 9.5 %
MCH RBC QN AUTO: 30.1 PG (ref 26–34)
MCHC RBC AUTO-ENTMCNC: 33.6 G/DL (ref 31–37)
MCV RBC AUTO: 89.7 FL
MONOCYTES # BLD AUTO: 0.41 X10(3) UL (ref 0.1–1)
MONOCYTES NFR BLD AUTO: 7.2 %
NEUTROPHILS # BLD AUTO: 4.68 X10 (3) UL (ref 1.5–7.7)
NEUTROPHILS # BLD AUTO: 4.68 X10(3) UL (ref 1.5–7.7)
NEUTROPHILS NFR BLD AUTO: 82.4 %
OSMOLALITY SERPL CALC.SUM OF ELEC: 295 MOSM/KG (ref 275–295)
PLATELET # BLD AUTO: 131 10(3)UL (ref 150–450)
PLATELETS.RETICULATED NFR BLD AUTO: 6.7 % (ref 0–7)
POTASSIUM SERPL-SCNC: 4.1 MMOL/L (ref 3.5–5.1)
Q-T INTERVAL: 404 MS
Q-T INTERVAL: 404 MS
QRS DURATION: 138 MS
QRS DURATION: 138 MS
QTC CALCULATION (BEZET): 429 MS
QTC CALCULATION (BEZET): 474 MS
R AXIS: -1 DEGREES
R AXIS: -2 DEGREES
RBC # BLD AUTO: 3.02 X10(6)UL
SODIUM SERPL-SCNC: 138 MMOL/L (ref 136–145)
T AXIS: 116 DEGREES
T AXIS: 141 DEGREES
VENTRICULAR RATE: 68 BPM
VENTRICULAR RATE: 83 BPM
WBC # BLD AUTO: 5.7 X10(3) UL (ref 4–11)

## 2024-06-17 PROCEDURE — 94640 AIRWAY INHALATION TREATMENT: CPT

## 2024-06-17 PROCEDURE — 80048 BASIC METABOLIC PNL TOTAL CA: CPT | Performed by: HOSPITALIST

## 2024-06-17 PROCEDURE — 85025 COMPLETE CBC W/AUTO DIFF WBC: CPT | Performed by: HOSPITALIST

## 2024-06-17 PROCEDURE — 82962 GLUCOSE BLOOD TEST: CPT

## 2024-06-17 RX ORDER — IPRATROPIUM BROMIDE AND ALBUTEROL SULFATE 2.5; .5 MG/3ML; MG/3ML
3 SOLUTION RESPIRATORY (INHALATION) 2 TIMES DAILY
Status: DISCONTINUED | OUTPATIENT
Start: 2024-06-17 | End: 2024-06-19

## 2024-06-17 RX ORDER — IPRATROPIUM BROMIDE AND ALBUTEROL SULFATE 2.5; .5 MG/3ML; MG/3ML
3 SOLUTION RESPIRATORY (INHALATION)
Status: DISCONTINUED | OUTPATIENT
Start: 2024-06-17 | End: 2024-06-17

## 2024-06-17 RX ORDER — ALBUTEROL SULFATE 2.5 MG/3ML
SOLUTION RESPIRATORY (INHALATION)
Status: COMPLETED
Start: 2024-06-17 | End: 2024-06-17

## 2024-06-17 RX ORDER — FUROSEMIDE 10 MG/ML
20 INJECTION INTRAMUSCULAR; INTRAVENOUS ONCE
Status: COMPLETED | OUTPATIENT
Start: 2024-06-17 | End: 2024-06-17

## 2024-06-17 RX ORDER — CARVEDILOL 6.25 MG/1
6.25 TABLET ORAL 2 TIMES DAILY WITH MEALS
Status: DISCONTINUED | OUTPATIENT
Start: 2024-06-17 | End: 2024-06-19

## 2024-06-17 NOTE — CONSULTS
Cardiology Consultation Note      Hayden Mg Patient Status:  Inpatient    1930 MRN [id number]   Location [facility] 5NW-A Attending Alejandra Atwood MD   Hosp Day # 1 PCP Isa Galarza MD     Reason for consultation:  Troponin     Impression:  COVID  MORAN  Mild troponin elevation  Sinus with PAC and possible MOBITZ II  Chronic HfPEF  DMII  CKDIII  HTN  HLD   Pulmonary HTN by echo    Plan:  Troponin with relatively flat trend. No chest pain. EKG without ischemia . He does not want to pursue angiogram so no further troponin to be drawn unless he has chest pain or there is clinical deterioration   Echo last week with normal LV function and severely elevated RVSP:. Can give IV lasix today and redose tomorrow based on response   Continue ASA  Given bradycardia and possible MOBITZ along with relative hypotension, reduce coreg to 6.25 mg BID. May need to switch to metoprolol  Tele   I/O   Treatment of COVID per primary       History of Present Illness:  Hayden Mg is a 89 year old male who presented to [facility] on 2024.      The patient has a complex medical history who presented with cough and weakness for few days.  Per family not eating much and feeling weak.  Found to have temp of 100.7 and COVID-positive..    EKG shows sinus with PACs and occasional dropped beats.  Is on 25 mg twice daily of carvedilol.  Currently denying any chest pain.  History obtained from family    Cardiology consultation was requested.    Medications:  Current Facility-Administered Medications   Medication Dose Route Frequency    ipratropium-albuterol (Duoneb) 0.5-2.5 (3) MG/3ML inhalation solution 3 mL  3 mL Nebulization Q6H WA    nirmatrelvir-ritonavir (Paxlovid) 150-100 MG therapy pack 2 tablet  2 tablet Oral BID    enoxaparin (Lovenox) 40 MG/0.4ML SUBQ injection 40 mg  40 mg Subcutaneous Daily    acetaminophen (Tylenol Extra Strength) tab 500 mg  500 mg Oral Q4H PRN    polyethylene glycol (PEG 3350) (Miralax) 17 g oral  packet 17 g  17 g Oral Daily PRN    sennosides (Senokot) tab 17.2 mg  17.2 mg Oral Nightly PRN    bisacodyl (Dulcolax) 10 MG rectal suppository 10 mg  10 mg Rectal Daily PRN    fleet enema (Fleet) 7-19 GM/118ML rectal enema 133 mL  1 enema Rectal Once PRN    ondansetron (Zofran) 4 MG/2ML injection 4 mg  4 mg Intravenous Q6H PRN    metoclopramide (Reglan) 5 mg/mL injection 5 mg  5 mg Intravenous Q8H PRN    cefTRIAXone (Rocephin) 1 g in sodium chloride 0.9% 100 mL IVPB-ADDV  1 g Intravenous Q24H    azithromycin (Zithromax) tab 500 mg  500 mg Oral Daily    acidophilus (Probiotic) cap/tab 1 each  1 each Oral Daily    glucose (Dex4) 15 GM/59ML oral liquid 15 g  15 g Oral Q15 Min PRN    Or    glucose (Glutose) 40% oral gel 15 g  15 g Oral Q15 Min PRN    Or    glucose-vitamin C (Dex-4) chewable tab 4 tablet  4 tablet Oral Q15 Min PRN    Or    dextrose 50% injection 50 mL  50 mL Intravenous Q15 Min PRN    Or    glucose (Dex4) 15 GM/59ML oral liquid 30 g  30 g Oral Q15 Min PRN    Or    glucose (Glutose) 40% oral gel 30 g  30 g Oral Q15 Min PRN    Or    glucose-vitamin C (Dex-4) chewable tab 8 tablet  8 tablet Oral Q15 Min PRN    insulin aspart (NovoLOG) 100 Units/mL FlexPen 1-5 Units  1-5 Units Subcutaneous TID AC and HS    aspirin DR tab 81 mg  81 mg Oral Daily    carvedilol (Coreg) tab 25 mg  25 mg Oral BID with meals    cholecalciferol (Vitamin D3) tab 1,000 Units  1,000 Units Oral Daily    tamsulosin (Flomax) cap 0.4 mg  0.4 mg Oral Daily       Past Medical History:    BPH (benign prostatic hyperplasia)    Cataract    Diabetes (HCC)    Herpes encephalitis (HCC)    HTN (hypertension)    Visual impairment    glasses       History reviewed. No pertinent surgical history.    Family History  There is no family history of sudden cardiac death.    Social History   reports that he has quit smoking. He has never used smokeless tobacco. He reports that he does not drink alcohol and does not use drugs.     Allergies  Allergies    Allergen Reactions    Lisinopril Coughing         Review of Systems:  Constitutional: negative for fevers  Eyes: negative for visual disturbance  Ears, nose, mouth, throat, and face: negative for epistaxis  Respiratory: negative for dyspnea on exertion  Cardiovascular: negative for chest pain  Gastrointestinal: negative for melena  Genitourinary:negative for hematuria  Hematologic/lymphatic: negative for bleeding  Musculoskeletal:negative for myalgias  Neurological: negative for dizziness and headaches  Endocrine: negative for temperature intolerance      Physical Exam:  Blood pressure 144/59, pulse 52, temperature 98.9 °F (37.2 °C), temperature source Oral, resp. rate 20, weight 154 lb (69.9 kg), SpO2 95%.  Temp (24hrs), Av.3 °F (37.4 °C), Min:98.4 °F (36.9 °C), Max:100.7 °F (38.2 °C)    Wt Readings from Last 3 Encounters:   24 154 lb (69.9 kg)   24 143 lb (64.9 kg)   24 152 lb (68.9 kg)       General: Awake and alert; in no acute distress  HEENT: Extraocular movements are intact; sclerae are anicteric; scalp is atrauamatic; no thyromegaly  Neck: Supple; no JVD; no carotid bruits  Cardiac: Regular rate and regular rhythm; no murmurs/rubs/gallops are appreciated; PMI is non-displaced; there is no evidence of a sternal heave  Lungs: mild, expir wheezing   Abdomen: Soft, non-tender; bowel sounds are normoactive; no hepatosplenomegaly  Extremities: No clubbing or cyanosis; moves all 4 extremities normally  Psychiatric: Normal mood and affect; answers questions appropriately  Dermatologic: No rashes; normal skin turgor    Diagnostic testing:    EKG: sinus, pac, possible MOBITZ II    Labs:   No results found for: \"PT\", \"INR\"  Lab Results   Component Value Date    LDL 31 2024    HDL 51 2024    TRIG 55 2024    VLDL 7 2024     Lab Results   Component Value Date    WBC 5.7 2024    HGB 9.1 2024    HCT 27.1 2024    .0 2024    CREATSERUM 1.13  06/17/2024    BUN 33 06/17/2024     06/17/2024    K 4.1 06/17/2024     06/17/2024    CO2 17.0 06/17/2024     06/17/2024    CA 8.6 06/17/2024    ALB 3.8 06/16/2024    ALKPHO 51 06/16/2024    BILT 1.2 06/16/2024    TP 7.4 06/16/2024    AST 15 06/16/2024    ALT 20 06/16/2024    PGLU 130 06/17/2024         Thank you for allowing our practice to participate in the care of your patient. Please do not hesitate to contact me if you have any questions.    Olaf Hui MD

## 2024-06-17 NOTE — PAYOR COMM NOTE
--------------  ADMISSION REVIEW     Payor: [facility]  Subscriber #:  [id number]  Authorization Number: [id number]    Admit date: 6/16/24  Admit time:  6:34 PM       REVIEW DOCUMENTATION:      Patient Seen in: [facility] Emergency Department      History     Chief Complaint   Patient presents with    Weakness    Cough/URI     Stated Complaint: Weakness, fatigue, ams x4 days    Subjective:   HPI    Patient is a 89-year-old male with history including hypertension, high cholesterol, diabetes presenting for evaluation of cough, exertional dyspnea and weakness.  Cough started about 4 days, mostly nonproductive and he has not noticed fevers.  However over the last few days he has had progressively worsening exertional dyspnea, to the point where per family today he can only take 3-4 steps before he had to sit down to rest and was huffing and puffing.  No chest pain or tightness.  Does not have a history of asthma or COPD.  Complains intermittently of periumbilical abdominal pain as well.  Patient primarily speaks Croatian but family is at bedside to help with history.  At baseline he lives with a family member but per the family is very active, going outside for walks and gardening independently.    Objective:   Past Medical History:    BPH (benign prostatic hyperplasia)    Cataract    Diabetes (HCC)    Herpes encephalitis (HCC)    HTN (hypertension)    Visual impairment    glasses       Physical Exam     ED Triage Vitals [06/16/24 1523]   /67   Pulse 67   Resp 24   Temp 100 °F (37.8 °C)   Temp src Temporal   SpO2 94 %   O2 Device None (Room air)       Current Vitals:   Vital Signs  BP: 142/81  Pulse: 71  Resp: 26  Temp: 100 °F (37.8 °C)  Temp src: Temporal  MAP (mmHg): 96    Oxygen Therapy  SpO2: 95 %  O2 Device: Nasal cannula  O2 Flow Rate (L/min): 2 L/min      Physical Exam  Vitals and nursing note reviewed.   Constitutional:       Appearance: He is well-developed.   HENT:      Head:  Normocephalic and atraumatic.   Eyes:      Conjunctiva/sclera: Conjunctivae normal.      Pupils: Pupils are equal, round, and reactive to light.   Cardiovascular:      Rate and Rhythm: Normal rate and regular rhythm.      Heart sounds: Normal heart sounds.   Pulmonary:      Effort: Pulmonary effort is normal.      Breath sounds: Normal breath sounds.      Comments: Mild tachypnea although he is able to speak full sentences.  Rhonchi bilaterally, more pronounced on the left.  Aeration is fair.  Abdominal:      General: Bowel sounds are normal.      Palpations: Abdomen is soft.      Comments: Minimal periumbilical tenderness.  Nondistended.  No rebound or guarding.   Musculoskeletal:         General: Normal range of motion.      Comments: Trace edema bilateral lower extremities.   Skin:     General: Skin is warm and dry.   Neurological:      Mental Status: He is alert and oriented to person, place, and time.     ED Course     Labs Reviewed   COMP METABOLIC PANEL (14) - Abnormal; Notable for the following components:       Result Value    Glucose 155 (*)     CO2 19.0 (*)     BUN 31 (*)     Creatinine 1.34 (*)     eGFR-Cr 49 (*)     All other components within normal limits   URINALYSIS WITH CULTURE REFLEX - Abnormal; Notable for the following components:    Blood Urine 1+ (*)     Protein Urine 100 (*)     Squamous Epi. Cells Few (*)     All other components within normal limits   PRO BETA NATRIURETIC PEPTIDE - Abnormal; Notable for the following components:    Pro-Beta Natriuretic Peptide 12,340 (*)     All other components within normal limits   TROPONIN I HIGH SENSITIVITY - Abnormal; Notable for the following components:    Troponin I (High Sensitivity) 83 (*)     All other components within normal limits   SARS-COV-2/FLU A AND B/RSV BY PCR (GENEXPERT) - Abnormal; Notable for the following components:    SARS-CoV-2 (COVID-19) - (GeneXpert) Detected (*)    BLOOD CULTURE   BLOOD CULTURE     EKG    Rate, intervals and  axes as noted on EKG Report.  Rate: 68  Rhythm: Atrial Fibrillation  Reading: To be irregularly irregular rhythm consistent with rate controlled atrial fibrillation.  Left bundle branch block.  Atrial fibrillation appears new compared to prior EKG from February 2024.  Some baseline artifact.  Will get repeat EKG.    Repeat EKG performed at 1639.  EKG    Rate, intervals and axes as noted on EKG Report.  Rate: 83  Rhythm: Sinus Rhythm  Reading: Sinus rhythm that appears to be most consistent with type I second-degree block at least 1 dropped QRS complex.  Primarily 2-1 conduction.         XR CHEST AP PORTABLE  (CPT=71045)    Result Date: 6/16/2024  PROCEDURE:  XR CHEST AP PORTABLE CONCLUSION:  Patchy airspace disease slightly worse than 2/16/2024 could represent pneumonia superimposed on scarring or atelectasis.  There is a small to moderate left pleural effusion.   LOCATION:  [facility]      Dictated by (CST): Pablo Mares MD on 6/16/2024 at 3:47 PM     Finalized by (CST): Pablo Mares MD on 6/16/2024 at 3:48 PM          Update at 5 PM.  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  Discussed with the family.  He did had COVID back at the beginning of the pandemic and the initial 2 vaccines, nothing since.  No known sick or COVID contacts.  There is possible left lower lobe infiltrate with effusion on chest x-ray, unclear if this is bacterial pneumonia or just from his COVID.  He appears comfortable at rest but I do think he should be admitted for further treatment.  Will discuss with the hospitalist.  Additionally his troponin is mildly elevated.  Cardiology will see him in the hospital.    Disposition and Plan     Clinical Impression:  1. COVID-19    2. Hypoxia    3. Elevated troponin         Disposition:  Admit  6/16/2024  5:26 pm      Signed by Omar House MD on 6/16/2024  5:27 PM         HISTORY AND PHYSICAL      ASSESSMENT / PLAN:   89 yo man with h/o chfpef, dm, ckd3, htn who presented with sob, cough, weakness.  +covid and ?pna (seems less likely), also noted to have irregular cardiac rhythm.      Dyspnea/cough/weakness  - likely 2/2 +covid  +/- PNA  - paxlovid  - procal neg, but will give ctx/azithro for now given patchy area left lower lung and while awaiting blood cultures. Consider ID consult  - >90 % on RA although currently has 2L placed, cont to trend.   - pt consult     Elevated trop/bnp  Arrhythmia, ?PACs v block, possibly reactive to lung issues  - echo done recently  - cards consult  - trend trop     Chronic hfpef  - seems relatively euvolemic-slightly dry. Ivf given in ER     Dm- ssi, accuchecks     Ckd3- Cr stable, trend     Htn/hl- hold arb for now, give coreg. May need to hold statin while on paxlovid    CARDS CONSULT  6-17-24    Reason for consultation:  Troponin      Impression:  COVID  MORAN  Mild troponin elevation  Sinus with PAC and possible MOBITZ II  Chronic HfPEF  DMII  CKDIII  HTN  HLD   Pulmonary HTN by echo     Plan:  Troponin with relatively flat trend. No chest pain. EKG without ischemia . He does not want to pursue angiogram so no further troponin to be drawn unless he has chest pain or there is clinical deterioration   Echo last week with normal LV function and severely elevated RVSP:. Can give IV lasix today and redose tomorrow based on response   Continue ASA  Given bradycardia and possible MOBITZ along with relative hypotension, reduce coreg to 6.25 mg BID. May need to switch to metoprolol  Tele   I/O   Treatment of COVID per primary     EKG shows sinus with PACs and occasional dropped beats. Is on 25 mg twice daily of carvedilol. Currently denying any chest pain.     Lab Results   Component Value Date     WBC 5.7 06/17/2024     HGB 9.1 06/17/2024     HCT 27.1 06/17/2024     .0 06/17/2024     CREATSERUM 1.13 06/17/2024     BUN 33 06/17/2024      06/17/2024     K 4.1 06/17/2024      06/17/2024     CO2 17.0 06/17/2024      06/17/2024     CA 8.6 06/17/2024            MEDICATIONS ADMINISTERED IN LAST 1 DAY:  acetaminophen (Tylenol Extra Strength) tab 1,000 mg       Date Action Dose Route User    6/16/2024 1653 Given 1,000 mg Oral Mary Gil RN          acidophilus (Probiotic) cap/tab 1 each       Date Action Dose Route User    6/17/2024 0930 Given 1 each Oral Cristina Martínez RN          albuterol (Ventolin) (2.5 MG/3ML) 0.083% nebulizer solution       Date Action Dose Route User    6/17/2024 0352 Given 2.5 mg (none) Kartik Koroma, RT          aspirin DR tab 81 mg       Date Action Dose Route User    6/17/2024 0828 Given 81 mg Oral Cristina Martínez RN          azithromycin (Zithromax) tab 500 mg       Date Action Dose Route User    6/17/2024 0900 Given 500 mg Oral Cristina Martínez RN    6/16/2024 2000 Given 500 mg Oral Mi Conway RN          carvedilol (Coreg) tab 25 mg       Date Action Dose Route User    6/17/2024 0828 Given 25 mg Oral Cristina Martínez RN          cefTRIAXone (Rocephin) 1 g in sodium chloride 0.9% 100 mL IVPB-ADDV       Date Action Dose Route User    6/16/2024 2000 New Bag 1 g Intravenous Mi Conway RN          enoxaparin (Lovenox) 40 MG/0.4ML SUBQ injection 40 mg       Date Action Dose Route User    6/17/2024 0829 Given 40 mg Subcutaneous (Left Upper Abdomen) Cristina Martínez RN          furosemide (Lasix) 10 mg/mL injection 20 mg       Date Action Dose Route User    6/17/2024 1002 Given 20 mg Intravenous Cristina Martínez RN          insulin aspart (NovoLOG) 100 Units/mL FlexPen 1-5 Units       Date Action Dose Route User    6/17/2024 1154 Given 1 Units Subcutaneous (Left Upper Abdomen) Cristina Martínez RN    6/16/2024 2217 Given 2 Units Subcutaneous (Right Upper Arm) Mi Conway RN          ipratropium-albuterol (Duoneb) 0.5-2.5 (3) MG/3ML inhalation solution 3 mL       Date Action Dose Route User    6/17/2024 1340 Given 3 mL Nebulization Kalyan Blackwell RCP    6/17/2024 1108 Given 3 mL Nebulization Kalyan Blackwell RCP           nirmatrelvir-ritonavir (Paxlovid) 150-100 MG therapy pack 2 tablet       Date Action Dose Route User    6/17/2024 0828 Given 2 tablet Oral Cristina Martínez RN    6/16/2024 2000 Given 2 tablet Oral Mi Conway RN          sodium chloride 0.9% infusion   125 ML HR THEN DC       Date Action Dose Route User    6/16/2024 1744 New Bag 125 mL/hr Intravenous Mary Gil RN          tamsulosin (Flomax) cap 0.4 mg       Date Action Dose Route User    6/17/2024 0828 Given 0.4 mg Oral Cristina Martínez RN          cholecalciferol (Vitamin D3) tab 1,000 Units       Date Action Dose Route User    6/17/2024 0828 Given 1,000 Units Oral Cristina Martínez RN            Vitals (last day)       Date/Time Temp Pulse Resp BP SpO2 Weight O2 Device O2 Flow Rate (L/min) [facility]    06/17/24 1142 98.7 °F (37.1 °C) 72 20 139/60 95 % -- Nasal cannula 1 L/min ND    06/17/24 0842 -- -- -- -- 95 % -- Nasal cannula 1 L/min EM    06/17/24 0738 98.9 °F (37.2 °C) 52 20 144/59 95 % -- Nasal cannula 1 L/min ND    06/17/24 0600 98.4 °F (36.9 °C) 62 20 145/52 93 % -- Nasal cannula 1 L/min LP    06/17/24 0400 -- -- -- -- 94 % -- Nasal cannula 1 L/min     06/17/24 0331 -- 75 -- -- 88 % -- -- -- LP    06/17/24 0035 98.7 °F (37.1 °C) 55 18 124/57 96 % -- Nasal cannula 1 L/min LP    06/16/24 1900 -- 53 -- 140/55 94 % -- -- -- LP    06/16/24 1852 -- -- -- -- -- 154 lb -- -- AD    06/16/24 1815 -- 66 19 127/54 96 % -- Nasal cannula 2 L/min CN    06/16/24 1800 -- 67 24 125/53 97 % -- Nasal cannula 2 L/min     06/16/24 1747 100.7 °F (38.2 °C) -- -- -- -- -- -- -- CN    06/16/24 1745 -- 54 24 124/54 96 % -- Nasal cannula 2 L/min     06/16/24 1730 -- 71 20 130/53 94 % -- Nasal cannula 2 L/min     06/16/24 1715 -- 46 25 132/44 95 % -- Nasal cannula 2 L/min     06/16/24 1700 -- 57 25 145/60 92 % -- Nasal cannula 2 L/min     06/16/24 1645 -- 71 26 142/81 95 % -- Nasal cannula 2 L/min     06/16/24 1630 -- 72 27 143/61 94 % -- None (Room air) 2  L/min     06/16/24 1540 -- -- -- -- -- -- None (Room air) --     06/16/24 1523 100 °F (37.8 °C) 67 24 160/67 94 % 154 lb None (Room air) -- BM

## 2024-06-17 NOTE — PLAN OF CARE
Received patient awake with family at bedside. On O2 at 1 L, breath sounds clear. On tele, PVCs and occasional Mobitz type 1. No c/o pain voiced, no moaning or facial grimacing. On isolation for covid, started on Paxlovid.

## 2024-06-17 NOTE — PROGRESS NOTES
Took over care at 1730. Pt is A&Ox4. Weaned to room air at rest, O2 sating WNL. Tele-PVCs, mobitz type 1. Pt denies pain. Family at bedside. Safety precautions in place, no further needs at this time.

## 2024-06-17 NOTE — PLAN OF CARE
Assumed patient care at 0730.  Vital signs stable.  Patient alert and oriented x 4.  Martiniquais speaking with family at bedside to help translate.  Patient denies pain.  Only complaint is dyspnea with exertion.  Patient on 1L nasal cannula for comfort.    Problem: Patient/Family Goals       Problem: RESPIRATORY - ADULT  Goal: Achieves optimal ventilation and oxygenation  Description: INTERVENTIONS:  - Assess for changes in respiratory status  - Assess for changes in mentation and behavior  - Position to facilitate oxygenation and minimize respiratory effort  - Oxygen supplementation based on oxygen saturation or ABGs  - Provide Smoking Cessation handout, if applicable  - Encourage broncho-pulmonary hygiene including cough, deep breathe, Incentive Spirometry  - Assess the need for suctioning and perform as needed  - Assess and instruct to report SOB or any respiratory difficulty  - Respiratory Therapy support as indicated  - Manage/alleviate anxiety  - Monitor for signs/symptoms of CO2 retention  Outcome: Progressing     Problem: SAFETY ADULT - FALL  Goal: Free from fall injury  Description: INTERVENTIONS:  - Assess pt frequently for physical needs  - Identify cognitive and physical deficits and behaviors that affect risk of falls.  - [place] fall precautions as indicated by assessment.  - Educate pt/family on patient safety including physical limitations  - Instruct pt to call for assistance with activity based on assessment  - Modify environment to reduce risk of injury  - Provide assistive devices as appropriate  - Consider OT/PT consult to assist with strengthening/mobility  - Encourage toileting schedule  Outcome: Progressing     Problem: Impaired Communication  Goal: Patient will achieve maximal communication potential  Description: Interventions:  - Provide modeling for options to improve word retrieval in known context  Outcome: Progressing     Problem: Diabetes/Glucose Control  Goal: Glucose maintained  within prescribed range  Description: INTERVENTIONS:  - Monitor Blood Glucose as ordered  - Assess for signs and symptoms of hyperglycemia and hypoglycemia  - Administer ordered medications to maintain glucose within target range  - Assess barriers to adequate nutritional intake and initiate nutrition consult as needed  - Instruct patient on self management of diabetes  Outcome: Progressing

## 2024-06-17 NOTE — PROGRESS NOTES
.[facility] note    PCP: Isa Galarza MD    Chief Complaint:  F/u covid    SUBJECTIVE:  Feels a bit better but sob when getting up to bathroom. Eating okay.    OBJECTIVE:  Temp:  [98.4 °F (36.9 °C)-100.7 °F (38.2 °C)] 98.9 °F (37.2 °C)  Pulse:  [46-75] 52  Resp:  [18-27] 20  BP: (124-160)/(44-81) 144/59  SpO2:  [88 %-97 %] 95 %    Intake/Output:  No intake or output data in the 24 hours ending 06/17/24 0921    Last 3 Weights   06/16/24 1852 154 lb (69.9 kg)   06/16/24 1523 154 lb (69.9 kg)   02/29/24 0923 143 lb (64.9 kg)   02/16/24 1523 152 lb (68.9 kg)       Exam  Gen: No acute distress  HEENT: anicteric sclera, MMM  Pulm: wheezing b/l  CV: Heart with regular rate and rhythm, no peripheral edema  Abd: Abdomen soft, nontender, nondistended, no organomegaly, bowel sounds present  MSK: Full range of motion in extremities, no clubbing, no cyanosis  Skin: no rashes or lesions  Neuro: A&OX3, no focal deficits    Data Review:         Labs:     Recent Labs   Lab 06/16/24  1540 06/17/24  0730   WBC 9.1 5.7   HGB 10.6* 9.1*   MCV 89.9 89.7   .0 131.0*   NE 7.97* 4.68   LYMABS 0.49* 0.54*       Recent Labs   Lab 06/16/24  1540 06/16/24  1843 06/17/24  0730     --  138   K 4.5  --  4.1     --  113*   CO2 19.0*  --  17.0*   BUN 31*  --  33*   CREATSERUM 1.34* 1.27 1.13   CA 9.2  --  8.6   *  --  131*       Recent Labs   Lab 06/16/24  1540   ALT 20   AST 15   ALB 3.8       Recent Labs   Lab 06/16/24  1540   PBNP 12,340*   PCT <0.05       No results for input(s): \"CRP\", \"VIVEK\", \"LDH\", \"DDIMER\" in the last 168 hours.    Recent Labs   Lab 06/16/24 2126 06/17/24  0552   PGLU 201* 130*       Meds:   Scheduled Medication:   nirmatrelvir-ritonavir  2 tablet Oral BID    enoxaparin  40 mg Subcutaneous Daily    cefTRIAXone  1 g Intravenous Q24H    azithromycin  500 mg Oral Daily    acidophilus  1 each Oral Daily    insulin aspart  1-5 Units Subcutaneous TID AC and HS    aspirin  81 mg Oral Daily     carvedilol  25 mg Oral BID with meals    cholecalciferol  1,000 Units Oral Daily    tamsulosin  0.4 mg Oral Daily     Continuous Infusing Medication:  PRN Medication:  acetaminophen    polyethylene glycol (PEG 3350)    sennosides    bisacodyl    fleet enema    ondansetron    metoclopramide    glucose **OR** glucose **OR** glucose-vitamin C **OR** dextrose **OR** glucose **OR** glucose **OR** glucose-vitamin C       Microbiology:    No results found for this visit on 06/16/24.    Lab Results   Component Value Date    COVID19 Detected (A) 06/16/2024        Assessment/Plan:   89 yo man with h/o chfpef, dm, ckd3, htn who presented with sob, cough, weakness. +covid and ?pna (seems less likely), also noted to have irregular cardiac rhythm.      Dyspnea/cough/weakness  - likely 2/2 +covid  +/- PNA  - paxlovid  - procal neg, but will give ctx/azithro for now given patchy area left lower lung and while awaiting blood cultures. Consider ID consult  - add nebs, give dose of lasix. Wheezing today on exam  - >90 % on RA although currently has 2L placed, cont to trend.   - pt consult     Elevated trop/bnp  Arrhythmia, ?PACs v block, possibly reactive to lung issues  - echo done recently  - cards consult, discussed with Dr. Hui  - will stop trop checks, pt does not want aggressive mgmt for this  - reduce coreg dose as this could be contributing to av block  - will give dose of lasix today     Chronic hfpef  - seems relatively euvolemic. Wheezing on exam and was given IVF yesterday. Bnp elevated. Will give dose of lasix today.      Dm- ssi, accuchecks     Ckd3- Cr stable, trend     Htn/hl- hold arb for now, give coreg. hold statin while on paxlovid     SCDs, alexey Atwood MD  [facility]  Pager: [phone]

## 2024-06-18 LAB
ANION GAP SERPL CALC-SCNC: 7 MMOL/L (ref 0–18)
BASOPHILS # BLD AUTO: 0.02 X10(3) UL (ref 0–0.2)
BASOPHILS NFR BLD AUTO: 0.5 %
BUN BLD-MCNC: 29 MG/DL (ref 9–23)
CALCIUM BLD-MCNC: 8.5 MG/DL (ref 8.5–10.1)
CHLORIDE SERPL-SCNC: 112 MMOL/L (ref 98–112)
CO2 SERPL-SCNC: 21 MMOL/L (ref 21–32)
CREAT BLD-MCNC: 1.14 MG/DL
CREAT BLD-MCNC: 1.14 MG/DL
EGFRCR SERPLBLD CKD-EPI 2021: 60 ML/MIN/1.73M2 (ref 60–?)
EGFRCR SERPLBLD CKD-EPI 2021: 60 ML/MIN/1.73M2 (ref 60–?)
EOSINOPHIL # BLD AUTO: 0.07 X10(3) UL (ref 0–0.7)
EOSINOPHIL NFR BLD AUTO: 1.6 %
ERYTHROCYTE [DISTWIDTH] IN BLOOD BY AUTOMATED COUNT: 14.1 %
GLUCOSE BLD-MCNC: 124 MG/DL (ref 70–99)
GLUCOSE BLD-MCNC: 132 MG/DL (ref 70–99)
GLUCOSE BLD-MCNC: 137 MG/DL (ref 70–99)
GLUCOSE BLD-MCNC: 219 MG/DL (ref 70–99)
GLUCOSE BLD-MCNC: 221 MG/DL (ref 70–99)
HCT VFR BLD AUTO: 28.2 %
HGB BLD-MCNC: 9.3 G/DL
IMM GRANULOCYTES # BLD AUTO: 0.01 X10(3) UL (ref 0–1)
IMM GRANULOCYTES NFR BLD: 0.2 %
LYMPHOCYTES # BLD AUTO: 0.66 X10(3) UL (ref 1–4)
LYMPHOCYTES NFR BLD AUTO: 15 %
MCH RBC QN AUTO: 29.8 PG (ref 26–34)
MCHC RBC AUTO-ENTMCNC: 33 G/DL (ref 31–37)
MCV RBC AUTO: 90.4 FL
MONOCYTES # BLD AUTO: 0.5 X10(3) UL (ref 0.1–1)
MONOCYTES NFR BLD AUTO: 11.4 %
NEUTROPHILS # BLD AUTO: 3.13 X10 (3) UL (ref 1.5–7.7)
NEUTROPHILS # BLD AUTO: 3.13 X10(3) UL (ref 1.5–7.7)
NEUTROPHILS NFR BLD AUTO: 71.3 %
OSMOLALITY SERPL CALC.SUM OF ELEC: 298 MOSM/KG (ref 275–295)
PLATELET # BLD AUTO: 141 10(3)UL (ref 150–450)
POTASSIUM SERPL-SCNC: 3.5 MMOL/L (ref 3.5–5.1)
POTASSIUM SERPL-SCNC: 4.8 MMOL/L (ref 3.5–5.1)
RBC # BLD AUTO: 3.12 X10(6)UL
SODIUM SERPL-SCNC: 140 MMOL/L (ref 136–145)
WBC # BLD AUTO: 4.4 X10(3) UL (ref 4–11)

## 2024-06-18 PROCEDURE — 97161 PT EVAL LOW COMPLEX 20 MIN: CPT

## 2024-06-18 PROCEDURE — 94640 AIRWAY INHALATION TREATMENT: CPT

## 2024-06-18 PROCEDURE — 82565 ASSAY OF CREATININE: CPT | Performed by: HOSPITALIST

## 2024-06-18 PROCEDURE — 97530 THERAPEUTIC ACTIVITIES: CPT

## 2024-06-18 PROCEDURE — 80048 BASIC METABOLIC PNL TOTAL CA: CPT | Performed by: HOSPITALIST

## 2024-06-18 PROCEDURE — 84132 ASSAY OF SERUM POTASSIUM: CPT | Performed by: HOSPITALIST

## 2024-06-18 PROCEDURE — 85025 COMPLETE CBC W/AUTO DIFF WBC: CPT | Performed by: HOSPITALIST

## 2024-06-18 PROCEDURE — 97116 GAIT TRAINING THERAPY: CPT

## 2024-06-18 PROCEDURE — 97165 OT EVAL LOW COMPLEX 30 MIN: CPT

## 2024-06-18 PROCEDURE — 82962 GLUCOSE BLOOD TEST: CPT

## 2024-06-18 RX ORDER — POTASSIUM CHLORIDE 20 MEQ/1
40 TABLET, EXTENDED RELEASE ORAL EVERY 4 HOURS
Status: COMPLETED | OUTPATIENT
Start: 2024-06-18 | End: 2024-06-18

## 2024-06-18 RX ORDER — FUROSEMIDE 10 MG/ML
20 INJECTION INTRAMUSCULAR; INTRAVENOUS ONCE
Status: COMPLETED | OUTPATIENT
Start: 2024-06-18 | End: 2024-06-18

## 2024-06-18 NOTE — PROGRESS NOTES
Cardiology Progress Note    [facility] Patient Status:  Inpatient    1930 MRN [id number]   Location [facility] 5NW-A Attending Alejandra Atwood MD   Hosp Day # 2 PCP Isa Galarza MD     Reason for consultation:  Troponin      Impression:  COVID  MORAN  Mild troponin elevation  Sinus with PAC and possible MOBITZ II  Chronic HfPEF  DMII  CKDIII  HTN  HLD   Pulmonary HTN by echo     Plan:  Troponin with relatively flat trend. No chest pain. EKG without ischemia . He does not want to pursue angiogram so no further troponin to be drawn unless he has chest pain or there is clinical deterioration   Echo last week with normal LV function and severely elevated RVSP:. IV lasix given x 2 with reasonable response   Continue ASA  Given bradycardia and possible MOBITZ along with relative hypotension, we reduced coreg to 6.25 mg BID and rates have improved. May need to further decrease dose as outpt  Tele   I/O   Treatment of COVID per primary   Back on room air. No further diuresis today. Ok for DC from CV standpoint. Will need follow up in 2-4 weeks with APN      Subjective:  The patient denies any chest pain or dyspnea at this time.    Objective:  /63 (BP Location: Left arm)   Pulse 79   Temp 98.3 °F (36.8 °C) (Oral)   Resp 20   Wt 154 lb (69.9 kg)   SpO2 99%   BMI 22.10 kg/m²   Temp (24hrs), Av °F (37.2 °C), Min:97.4 °F (36.3 °C), Max:100.5 °F (38.1 °C)      Intake/Output Summary (Last 24 hours) at 2024 1517  Last data filed at 2024 1730  Gross per 24 hour   Intake 300 ml   Output --   Net 300 ml     Wt Readings from Last 3 Encounters:   24 154 lb (69.9 kg)   24 143 lb (64.9 kg)   24 152 lb (68.9 kg)       General: Awake and alert; in no acute distress  Cardiac: Regular rate and regular rhythm; no murmurs/rubs/gallops are appreciated  Lungs: Clear to auscultation bilaterally; no accessory muscle use  Abdomen: Soft, non-tender; bowel sounds are normoactive  Extremities: No  clubbing/cyanosis; moves all 4 extremities normally    Current Facility-Administered Medications   Medication Dose Route Frequency    potassium chloride (Klor-Con M20) tab 40 mEq  40 mEq Oral Q4H    carvedilol (Coreg) tab 6.25 mg  6.25 mg Oral BID with meals    ipratropium-albuterol (Duoneb) 0.5-2.5 (3) MG/3ML inhalation solution 3 mL  3 mL Nebulization BID    nirmatrelvir-ritonavir (Paxlovid) 150-100 MG therapy pack 2 tablet  2 tablet Oral BID    enoxaparin (Lovenox) 40 MG/0.4ML SUBQ injection 40 mg  40 mg Subcutaneous Daily    acetaminophen (Tylenol Extra Strength) tab 500 mg  500 mg Oral Q4H PRN    polyethylene glycol (PEG 3350) (Miralax) 17 g oral packet 17 g  17 g Oral Daily PRN    sennosides (Senokot) tab 17.2 mg  17.2 mg Oral Nightly PRN    bisacodyl (Dulcolax) 10 MG rectal suppository 10 mg  10 mg Rectal Daily PRN    fleet enema (Fleet) 7-19 GM/118ML rectal enema 133 mL  1 enema Rectal Once PRN    ondansetron (Zofran) 4 MG/2ML injection 4 mg  4 mg Intravenous Q6H PRN    metoclopramide (Reglan) 5 mg/mL injection 5 mg  5 mg Intravenous Q8H PRN    cefTRIAXone (Rocephin) 1 g in sodium chloride 0.9% 100 mL IVPB-ADDV  1 g Intravenous Q24H    acidophilus (Probiotic) cap/tab 1 each  1 each Oral Daily    glucose (Dex4) 15 GM/59ML oral liquid 15 g  15 g Oral Q15 Min PRN    Or    glucose (Glutose) 40% oral gel 15 g  15 g Oral Q15 Min PRN    Or    glucose-vitamin C (Dex-4) chewable tab 4 tablet  4 tablet Oral Q15 Min PRN    Or    dextrose 50% injection 50 mL  50 mL Intravenous Q15 Min PRN    Or    glucose (Dex4) 15 GM/59ML oral liquid 30 g  30 g Oral Q15 Min PRN    Or    glucose (Glutose) 40% oral gel 30 g  30 g Oral Q15 Min PRN    Or    glucose-vitamin C (Dex-4) chewable tab 8 tablet  8 tablet Oral Q15 Min PRN    insulin aspart (NovoLOG) 100 Units/mL FlexPen 1-5 Units  1-5 Units Subcutaneous TID AC and HS    aspirin DR tab 81 mg  81 mg Oral Daily    cholecalciferol (Vitamin D3) tab 1,000 Units  1,000 Units Oral Daily     [Held by provider] tamsulosin (Flomax) cap 0.4 mg  0.4 mg Oral Daily       Laboratory Data:  Lab Results   Component Value Date    WBC 4.4 06/18/2024    HGB 9.3 06/18/2024    HCT 28.2 06/18/2024    .0 06/18/2024     No results found for: \"INR\"  Lab Results   Component Value Date     06/18/2024    K 3.5 06/18/2024     06/18/2024    CO2 21.0 06/18/2024    BUN 29 06/18/2024    CREATSERUM 1.14 06/18/2024     06/18/2024    CA 8.5 06/18/2024       Telemetry: No malignant tachyarrhythmias or bradyarrhythmias      Thank you for allowing our practice to participate in the care of your patient. Please do not hesitate to contact me if you have any questions.    Olaf Hui MD

## 2024-06-18 NOTE — PHYSICAL THERAPY NOTE
PHYSICAL THERAPY EVALUATION - INPATIENT     Room Number: 530/530-A  Evaluation Date: 2024  Type of Evaluation: Initial  Physician Order: PT Eval and Treat    Presenting Problem: COVID-19  Co-Morbidities : DM2, HTN CKD3, chronic HFpEF  Reason for Therapy: Mobility Dysfunction and Discharge Planning    PHYSICAL THERAPY ASSESSMENT   Patient is a 89 year old male admitted 2024 for SOB and dx with Covid-19.   Patient is currently functioning near baseline with bed mobility, transfers, gait, and stair negotiation. Prior to admission, patient's baseline is modified indep with cane.     Patient will benefit from continued skilled PT Services For duration of hospitalization, however, given the patient is functioning near baseline level do not anticipate skilled therapy needs at discharge .    PLAN  Patient has been evaluated and presents with no skilled Physical Therapy needs at this time.  Patient discharged from Physical Therapy services.  Please re-order if a new functional limitation presents during this admission.    GOALS  Patient was able to achieve the following goals ...    Patient was able to transfer At previous, functional level   Patient able to ambulate on level surfaces At previous, functional level         HOME SITUATION  Type of Home: House   Home Layout: Two level        Stairs to Bedroom: 14  Railing: Yes    Lives With: Family (son and son's family)  Drives: No  Patient Owned Equipment: Cane       Prior Level of [place]: per pt/pt son reports, pt amb with cane without assist. Pt able to ascend 14 steps to 2nd floor bedroom without difficulty. Pt is not on home O2. Pt denies hx of falls.      SUBJECTIVE  \"I want to walk some more.\"      OBJECTIVE  Precautions: Bed/chair alarm; needed (Lao speaking)  Fall Risk: Standard fall risk    WEIGHT BEARING RESTRICTION  Weight Bearing Restriction: None                PAIN ASSESSMENT  Ratin  Location: denies pain at this time        COGNITION  Overall Cognitive Status:  WFL - within functional limits    RANGE OF MOTION AND STRENGTH ASSESSMENT  Upper extremity ROM and strength are within functional limits     Lower extremity ROM is within functional limits     Lower extremity strength is within functional limits       BALANCE  Static Sitting: Good  Dynamic Sitting: Good  Static Standing: Fair +  Dynamic Standing: Fair    ADDITIONAL TESTS                                    ACTIVITY TOLERANCE                         O2 WALK  Oxygen Therapy  SPO2% on Oxygen at Rest: 96  At rest oxygen flow (liters per minute): 2  SPO2% Ambulation on Oxygen: 94  Ambulation oxygen flow (liters per minute): 2    NEUROLOGICAL FINDINGS  Neurological Findings: None                     AM-PAC '6-Clicks' INPATIENT SHORT FORM - BASIC MOBILITY  How much difficulty does the patient currently have...  Patient Difficulty: Turning over in bed (including adjusting bedclothes, sheets and blankets)?: None   Patient Difficulty: Sitting down on and standing up from a chair with arms (e.g., wheelchair, bedside commode, etc.): None   Patient Difficulty: Moving from lying on back to sitting on the side of the bed?: None   How much help from another person does the patient currently need...   Help from Another: Moving to and from a bed to a chair (including a wheelchair)?: A Little   Help from Another: Need to walk in hospital room?: A Little   Help from Another: Climbing 3-5 steps with a railing?: A Little       AM-PAC Score:  Raw Score: 21   Approx Degree of Impairment: 28.97%   Standardized Score (AM-PAC Scale): 50.25   CMS Modifier (G-Code): CJ    FUNCTIONAL ABILITY STATUS  Gait Assessment   Functional Mobility/Gait Assessment  Gait Assistance: Supervision  Distance (ft): 50  Assistive Device: Cane  Pattern: Within Functional Limits    Skilled Therapy Provided     Bed Mobility:  Rolling: not tested  Supine to sit: modified indep   Sit to supine: not tested     Transfer  Mobility:  Sit to stand: supervision    Stand to sit: supervision  Gait = pt amb x 50 feet within pt room with cane with SBA.    Therapist's comments:per RN pt ok for PT. Pt received in bed on 2 L O2, O2 sat monitored throughout session and >90%.  Pt son at bedside and provides translation, refused ipad .  Pt denies dizziness and pain. Pt amb within pt room and was seated in bedside chair, minimal SOB reported. Pt educated on call don't fall, activity recommendations (can amb with nursing staff), and questions answered. Pt left in chair with alarm set.  RN updated.   Son in room with pt.     Exercise/Education Provided:  Bed mobility  Functional activity tolerated  Gait training  Transfer training    Patient End of Session: Up in chair;Needs met;Call light within reach;RN aware of session/findings;All patient questions and concerns addressed;Alarm set;Family present    Patient Evaluation Complexity Level:  History Moderate - 1 or 2 personal factors and/or co-morbidities   Examination of body systems Low - addressing 1-2 elements   Clinical Presentation Low - Stable   Clinical Decision Making Low Complexity       PT Session Time: 25 minutes  Gait Training: 10 minutes

## 2024-06-18 NOTE — PROGRESS NOTES
[facility] Pharmacy Dosing Service   Initial Renal Dosing and Drug-Drug Interaction (DDI) Review for Paxlovid (Nirmatrelvir/Ritonavir)    Hayden Mg is a 89 year old male who is being initiated on Paxlovid (nirmatrelvir/ritonavir) therapy COVID-19 infection.  Pharmacy has been asked to dose and review Paxlovid DDI's by Dr Omar Douglas (ED Physician). Paxlovid has been continued inpatient by Dr Alejandra Atwood.      Labs:   Recent Labs   Lab 06/16/24  1540   EGFRCR 49*       Assessment/Plan:   Eligibility criteria for Paxlovid use have been met..    1) Based on the above assessment:  Recommend initiation of Paxlovid (Nirmatrelvir-ritonavir 150-100 mg) PO BID x 5 days based on GFR    2) Potentially manageable (with or without expert consultation) DDI's exist. The below recommendations were made and discussed with Dr Atwood on 6/16:  Medication Recommended Management Strategy    atorvastatin  Hold atorvastatin during nirmatrelvir/ritonavir treatment and restart 3 days after the last dose of nirmatrelvir/ritonavir    tamsulosin  Hold tamsulosin during nirmatrelvir/ritonavir treatment and restart 3 days after the last dose of nirmatrelvir/ritonavir     3) Pharmacy will continue to follow and monitor for DDI's for the duration of Paxlovid therapy.       Prior to Admission Medications  No current facility-administered medications on file prior to encounter.     Current Outpatient Medications on File Prior to Encounter   Medication Sig Dispense Refill    ATORVASTATIN 10 MG Oral Tab Take 1 tablet by mouth nightly 30 tablet 2    aspirin 81 MG Oral Tab EC Take 1 tablet (81 mg total) by mouth daily.      losartan 100 MG Oral Tab Take 1 tablet (100 mg total) by mouth daily. 90 tablet 3    metFORMIN  MG Oral Tablet 24 Hr Take 1 tablet (500 mg total) by mouth daily with breakfast. 90 tablet 3    carvedilol 25 MG Oral Tab Take 1 tablet (25 mg total) by mouth 2 (two) times daily with meals. 180 tablet 3    tamsulosin 0.4 MG  Oral Cap Take 1 capsule (0.4 mg total) by mouth daily. 90 capsule 3    pantoprazole 40 MG Oral Tab EC Take 1 tablet (40 mg total) by mouth daily. 90 tablet 3    Cholecalciferol (VITAMIN D) 1000 UNITS Oral Tab Take 1,000 Units by mouth daily. 30 tablet 0    OneTouch Delica Lancets 30G Does not apply Misc 1 lancet by Finger stick route daily. Test Blood Sugar Once Daily. Non-Insulin Dependent Diabetes Type II. E11.9. 100 each 3    Glucose Blood (ONETOUCH ULTRA) In Vitro Strip Test Blood Sugar Once Daily. Non-Insulin Dependent Diabetes Type II. E11.9. 100 each 3    Blood Glucose Monitoring Suppl (BLOOD GLUCOSE MONITOR SYSTEM) w/Device Does not apply Kit 1 each by Does not apply route 2 (two) times daily. Test Blood Sugar Twice Daily. Non-Insulin Dependent Diabetes Type II. E11.9. 1 kit 0    ibuprofen 400 MG Oral Tab Take 400 mg by mouth every 6 (six) hours as needed for Pain. (Patient not taking: Reported on 10/7/2021)      acetaminophen 500 MG Oral Tab Take 1,000 mg by mouth every 6 (six) hours as needed for Pain. (Patient not taking: Reported on 10/7/2021)         Current Medications    Current Facility-Administered Medications:     sodium chloride 0.9% infusion, 125 mL/hr, Intravenous, Continuous    nirmatrelvir-ritonavir (Paxlovid) 150-100 MG therapy pack 2 tablet, 2 tablet, Oral, BID    [START ON 6/17/2024] enoxaparin (Lovenox) 40 MG/0.4ML SUBQ injection 40 mg, 40 mg, Subcutaneous, Daily    acetaminophen (Tylenol Extra Strength) tab 500 mg, 500 mg, Oral, Q4H PRN    polyethylene glycol (PEG 3350) (Miralax) 17 g oral packet 17 g, 17 g, Oral, Daily PRN    sennosides (Senokot) tab 17.2 mg, 17.2 mg, Oral, Nightly PRN    bisacodyl (Dulcolax) 10 MG rectal suppository 10 mg, 10 mg, Rectal, Daily PRN    fleet enema (Fleet) 7-19 GM/118ML rectal enema 133 mL, 1 enema, Rectal, Once PRN    ondansetron (Zofran) 4 MG/2ML injection 4 mg, 4 mg, Intravenous, Q6H PRN    metoclopramide (Reglan) 5 mg/mL injection 5 mg, 5 mg,  Intravenous, Q8H PRN    cefTRIAXone (Rocephin) 1 g in sodium chloride 0.9% 100 mL IVPB-ADDV, 1 g, Intravenous, Q24H    azithromycin (Zithromax) tab 500 mg, 500 mg, Oral, Daily    acidophilus (Probiotic) cap/tab 1 each, 1 each, Oral, Daily    glucose (Dex4) 15 GM/59ML oral liquid 15 g, 15 g, Oral, Q15 Min PRN **OR** glucose (Glutose) 40% oral gel 15 g, 15 g, Oral, Q15 Min PRN **OR** glucose-vitamin C (Dex-4) chewable tab 4 tablet, 4 tablet, Oral, Q15 Min PRN **OR** dextrose 50% injection 50 mL, 50 mL, Intravenous, Q15 Min PRN **OR** glucose (Dex4) 15 GM/59ML oral liquid 30 g, 30 g, Oral, Q15 Min PRN **OR** glucose (Glutose) 40% oral gel 30 g, 30 g, Oral, Q15 Min PRN **OR** glucose-vitamin C (Dex-4) chewable tab 8 tablet, 8 tablet, Oral, Q15 Min PRN    insulin aspart (NovoLOG) 100 Units/mL FlexPen 1-5 Units, 1-5 Units, Subcutaneous, TID AC and HS    Discontinued Medications:  There are no discontinued medications.    We appreciate the opportunity to assist in the care of this patient.     Zachariah Barron, PharmD, BCPS, BCIDP  Clinical Pharmacist Specialist - Antimicrobial Stewardship  [facility]  Phone: [phone]

## 2024-06-18 NOTE — OCCUPATIONAL THERAPY NOTE
OCCUPATIONAL THERAPY EVALUATION - INPATIENT    Room Number: 530/530-A  Evaluation Date: 6/18/2024     Type of Evaluation: Initial  Presenting Problem: COVID-19    Physician Order: IP Consult to Occupational Therapy  Reason for Therapy:  ADL/IADL Dysfunction and Discharge Planning      OCCUPATIONAL THERAPY ASSESSMENT   Patient is a 89 year old male admitted on 6/16/2024 with Presenting Problem: COVID-19. Co-Morbidities : DM2, HTN CKD3, chronic HFpEF  Patient is currently functioning at baseline with toileting, bathing, upper body dressing, lower body dressing, bed mobility, and transfers.  Prior to admission, patient's baseline is mod I with the use of a cane.  Patient met all OT goals at supervision level.  Patient reports no further questions/concerns at this time.     Patient will benefit from discharging home with family assist as needed with IADL.       WEIGHT BEARING RESTRICTION  Weight Bearing Restriction: None                Recommendations for nursing staff:   Transfers: supervision with cane   Toileting location: Toilet    EVALUATION SESSION:  Patient at start of session: Pt was found in his bed with his son at the bedside.   FUNCTIONAL TRANSFER ASSESSMENT  Sit to Stand: Edge of Bed  Edge of Bed: Supervision  Toilet Transfer: Supervision    BED MOBILITY  Supine to Sit : Supervision    BALANCE ASSESSMENT     FUNCTIONAL ADL ASSESSMENT  LB Dressing Seated: Supervision  Toileting Seated: Supervision  Toileting Standing: Supervision      ACTIVITY TOLERANCE:                          O2 SATURATIONS       COGNITION  Overall Cognitive Status:  WFL - within functional limits  COGNITION ASSESSMENTS       Upper Extremity:   ROM: within functional limits   Strength: is within functional limits       EDUCATION PROVIDED  Patient: Role of Occupational Therapy; Plan of Care; Discharge Recommendations; Functional Transfer Techniques; Fall Prevention; Compensatory ADL Techniques  Patient's Response to Education: Verbalized  Understanding    Equipment used: cane, gait belt   Demonstrates functional use    Therapist comments: supine>sit EOB>LB dressing to don shoes>stand to cane>walk to bathroom>toilet T/F>stand to cane>walk to sit in chair     Patient End of Session: Up in chair;Needs met;Call light within reach;RN aware of session/findings;All patient questions and concerns addressed;Alarm set;Family present    OCCUPATIONAL PROFILE    HOME SITUATION  Type of Home: House  Home Layout: Two level  Lives With: Family (son and son's family)    Toilet and Equipment: Standard height toilet  Shower/Tub and Equipment: Walk-in shower;Grab bar  Other Equipment: Other (Comment) (cane)    Occupation/Status: Retired   Hand Dominance: Right  Drives: No  Patient Regularly Uses: Reading glasses    Prior Level of Function: Pt lives with his son and his son's family. Pt uses a cane. Pt is typically mod I with all BADL. Pt enjoys gardening.     SUBJECTIVE  \"He loves to garden.\" Per pt's son present     PAIN ASSESSMENT  Ratin  Location: Pt denies pain       OBJECTIVE  Precautions: Bed/chair alarm; needed (Lithuanian speaking)  Fall Risk: Standard fall risk    WEIGHT BEARING RESTRICTION  Weight Bearing Restriction: None                AM-PAC ‘6-Clicks’ Inpatient Daily Activity Short Form  -   Putting on and taking off regular lower body clothing?: A Little  -   Bathing (including washing, rinsing, drying)?: A Little  -   Toileting, which includes using toilet, bedpan or urinal? : A Little  -   Putting on and taking off regular upper body clothing?: A Little  -   Taking care of personal grooming such as brushing teeth?: None  -   Eating meals?: None    AM-PAC Score:  Score: 20  Approx Degree of Impairment: 38.32%  Standardized Score (AM-PAC Scale): 42.03      ADDITIONAL TESTS     NEUROLOGICAL FINDINGS        PLAN   Patient has been evaluated and presents with no skilled Occupational Therapy needs at this time.  Patient  discharged from Occupational Therapy services.  Please re-order if a new functional limitation presents during this admission.      Patient Evaluation Complexity Level:   Occupational Profile/Medical History LOW - Brief history including review of medical or therapy records    Specific performance deficits impacting engagement in ADL/IADL LOW  1 - 3 performance deficits    Client Assessment/Performance Deficits LOW - No comorbidities nor modifications of tasks    Clinical Decision Making LOW - Analysis of occupational profile, problem-focused assessments, limited treatment options    Overall Complexity LOW     OT Session Time: 25 minutes  Therapeutic Activity: 10 minutes

## 2024-06-18 NOTE — PROGRESS NOTES
Cardiology Progress Note    Hayden Mg Patient Status:  Inpatient    1930 MRN [id number]   Location [facility] 5NW-A Attending Alejandra Atwood MD   Hosp Day # 2 PCP Isa Galarza MD       Impression:  COVID  MORAN  Mild troponin elevation  Sinus with PAC and possible MOBITZ II  Chronic HfPEF  DMII  CKDIII  HTN  HLD   Pulmonary HTN by echo     Plan:  Troponin with relatively flat trend. No chest pain. EKG without ischemia . He does not want to pursue angiogram so no further troponin to be drawn unless he has chest pain or there is clinical deterioration   Echo last week with normal LV function and severely elevated RVSP:.Redose IV lasix 20 mg today. Renal function stable   Continue ASA  Given bradycardia and possible MOBITZ along with relative hypotension, we reduced coreg to 6.25 mg BID. Heart rates better rtoday   Tele   I/O   Treatment of COVID per primary          Subjective:  The patient denies any chest pain or dyspnea at this time.    Objective:  /63 (BP Location: Left arm)   Pulse 79   Temp 98.3 °F (36.8 °C) (Oral)   Resp 20   Wt 154 lb (69.9 kg)   SpO2 99%   BMI 22.10 kg/m²   Temp (24hrs), Av °F (37.2 °C), Min:97.4 °F (36.3 °C), Max:100.5 °F (38.1 °C)      Intake/Output Summary (Last 24 hours) at 2024 1526  Last data filed at 2024 1730  Gross per 24 hour   Intake 300 ml   Output --   Net 300 ml     Wt Readings from Last 3 Encounters:   24 154 lb (69.9 kg)   24 143 lb (64.9 kg)   24 152 lb (68.9 kg)       General: Awake and alert; in no acute distress  Cardiac: Regular rate and regular rhythm; no murmurs/rubs/gallops are appreciated  Lungs: mild expiratory wheezing. Congested   Abdomen: Soft, non-tender; bowel sounds are normoactive  Extremities: No clubbing/cyanosis; moves all 4 extremities normally    Current Facility-Administered Medications   Medication Dose Route Frequency    potassium chloride (Klor-Con M20) tab 40 mEq  40 mEq Oral Q4H    carvedilol  (Coreg) tab 6.25 mg  6.25 mg Oral BID with meals    ipratropium-albuterol (Duoneb) 0.5-2.5 (3) MG/3ML inhalation solution 3 mL  3 mL Nebulization BID    nirmatrelvir-ritonavir (Paxlovid) 150-100 MG therapy pack 2 tablet  2 tablet Oral BID    enoxaparin (Lovenox) 40 MG/0.4ML SUBQ injection 40 mg  40 mg Subcutaneous Daily    acetaminophen (Tylenol Extra Strength) tab 500 mg  500 mg Oral Q4H PRN    polyethylene glycol (PEG 3350) (Miralax) 17 g oral packet 17 g  17 g Oral Daily PRN    sennosides (Senokot) tab 17.2 mg  17.2 mg Oral Nightly PRN    bisacodyl (Dulcolax) 10 MG rectal suppository 10 mg  10 mg Rectal Daily PRN    fleet enema (Fleet) 7-19 GM/118ML rectal enema 133 mL  1 enema Rectal Once PRN    ondansetron (Zofran) 4 MG/2ML injection 4 mg  4 mg Intravenous Q6H PRN    metoclopramide (Reglan) 5 mg/mL injection 5 mg  5 mg Intravenous Q8H PRN    cefTRIAXone (Rocephin) 1 g in sodium chloride 0.9% 100 mL IVPB-ADDV  1 g Intravenous Q24H    acidophilus (Probiotic) cap/tab 1 each  1 each Oral Daily    glucose (Dex4) 15 GM/59ML oral liquid 15 g  15 g Oral Q15 Min PRN    Or    glucose (Glutose) 40% oral gel 15 g  15 g Oral Q15 Min PRN    Or    glucose-vitamin C (Dex-4) chewable tab 4 tablet  4 tablet Oral Q15 Min PRN    Or    dextrose 50% injection 50 mL  50 mL Intravenous Q15 Min PRN    Or    glucose (Dex4) 15 GM/59ML oral liquid 30 g  30 g Oral Q15 Min PRN    Or    glucose (Glutose) 40% oral gel 30 g  30 g Oral Q15 Min PRN    Or    glucose-vitamin C (Dex-4) chewable tab 8 tablet  8 tablet Oral Q15 Min PRN    insulin aspart (NovoLOG) 100 Units/mL FlexPen 1-5 Units  1-5 Units Subcutaneous TID AC and HS    aspirin DR tab 81 mg  81 mg Oral Daily    cholecalciferol (Vitamin D3) tab 1,000 Units  1,000 Units Oral Daily    [Held by provider] tamsulosin (Flomax) cap 0.4 mg  0.4 mg Oral Daily       Laboratory Data:  Lab Results   Component Value Date    WBC 4.4 06/18/2024    HGB 9.3 06/18/2024    HCT 28.2 06/18/2024    .0  06/18/2024     No results found for: \"INR\"  Lab Results   Component Value Date     06/18/2024    K 3.5 06/18/2024     06/18/2024    CO2 21.0 06/18/2024    BUN 29 06/18/2024    CREATSERUM 1.14 06/18/2024     06/18/2024    CA 8.5 06/18/2024       Telemetry: No malignant tachyarrhythmias or bradyarrhythmias      Thank you for allowing our practice to participate in the care of your patient. Please do not hesitate to contact me if you have any questions.    Olaf Hui MD

## 2024-06-18 NOTE — PROGRESS NOTES
[facility] note    PCP: Isa Galarza MD    Chief Complaint:  F/u covid    SUBJECTIVE:  Breathing noted to be a bit better when getting up  Still notes some wheeze at times    OBJECTIVE:  Temp:  [98.3 °F (36.8 °C)-100.5 °F (38.1 °C)] 98.3 °F (36.8 °C)  Pulse:  [58-98] 76  Resp:  [20] 20  BP: (139-164)/(60-89) 141/89  SpO2:  [90 %-98 %] 93 %    Intake/Output:    Intake/Output Summary (Last 24 hours) at 6/18/2024 0900  Last data filed at 6/17/2024 1730  Gross per 24 hour   Intake 300 ml   Output --   Net 300 ml       Last 3 Weights   06/16/24 1852 154 lb (69.9 kg)   06/16/24 1523 154 lb (69.9 kg)   02/29/24 0923 143 lb (64.9 kg)   02/16/24 1523 152 lb (68.9 kg)       Exam  Gen: No acute distress  HEENT: anicteric sclera, MMM  Pulm: wheezing b/l, some rhonchi  CV: Heart with regular rate and rhythm, no peripheral edema  Abd: Abdomen soft, nontender, nondistended, no organomegaly, bowel sounds present  MSK: Full range of motion in extremities, no clubbing, no cyanosis  Skin: no rashes or lesions  Neuro: A&OX3, no focal deficits    Data Review:         Labs:     Recent Labs   Lab 06/16/24  1540 06/17/24  0730 06/18/24  1008   WBC 9.1 5.7 4.4   HGB 10.6* 9.1* 9.3*   MCV 89.9 89.7 90.4   .0 131.0* 141.0*   NE 7.97* 4.68 3.13   LYMABS 0.49* 0.54* 0.66*       Recent Labs   Lab 06/16/24  1540 06/16/24  1843 06/17/24  0730     --  138   K 4.5  --  4.1     --  113*   CO2 19.0*  --  17.0*   BUN 31*  --  33*   CREATSERUM 1.34* 1.27 1.13   CA 9.2  --  8.6   *  --  131*       Recent Labs   Lab 06/16/24  1540   ALT 20   AST 15   ALB 3.8       Recent Labs   Lab 06/16/24  1540   PBNP 12,340*   PCT <0.05       No results for input(s): \"CRP\", \"VIVEK\", \"LDH\", \"DDIMER\" in the last 168 hours.    Recent Labs   Lab 06/17/24  0552 06/17/24  1139 06/17/24  1633 06/17/24  2054 06/18/24  0525   PGLU 130* 156* 117* 163* 132*       Meds:   Scheduled Medication:   carvedilol  6.25 mg Oral BID with meals     ipratropium-albuterol  3 mL Nebulization BID    nirmatrelvir-ritonavir  2 tablet Oral BID    enoxaparin  40 mg Subcutaneous Daily    cefTRIAXone  1 g Intravenous Q24H    azithromycin  500 mg Oral Daily    acidophilus  1 each Oral Daily    insulin aspart  1-5 Units Subcutaneous TID AC and HS    aspirin  81 mg Oral Daily    cholecalciferol  1,000 Units Oral Daily    [Held by provider] tamsulosin  0.4 mg Oral Daily     Continuous Infusing Medication:  PRN Medication:  acetaminophen    polyethylene glycol (PEG 3350)    sennosides    bisacodyl    fleet enema    ondansetron    metoclopramide    glucose **OR** glucose **OR** glucose-vitamin C **OR** dextrose **OR** glucose **OR** glucose **OR** glucose-vitamin C       Microbiology:    Hospital Encounter on 06/16/24   1. Blood Culture     Status: None (Preliminary result)    Collection Time: 06/16/24  4:43 PM    Specimen: Blood,peripheral   Result Value Ref Range    Blood Culture Result No Growth 1 Day N/A       Lab Results   Component Value Date    COVID19 Detected (A) 06/16/2024        Assessment/Plan:   89 yo man with h/o chfpef, dm, ckd3, htn who presented with sob, cough, weakness. +covid and ?pna (seems less likely), also noted to have irregular cardiac rhythm.      Dyspnea/cough/weakness  - likely 2/2 +covid  +/- PNA  - paxlovid  - procal neg, but will give ctx/azithro for now given patchy area left lower lung. Blood cultures ntd  - on nebs, may consider repeat lasix today as well. Await bmp  - o2 walk  - pt consult     Elevated trop/bnp  Arrhythmia, ?PACs v block, possibly reactive to lung issues  - echo done recently  - cards consult, discussed with Dr. Hui  - will stop trop checks, pt does not want aggressive mgmt for this  - reduced coreg dose as this could be contributing to av block- seems to have helped rhythm  - possibly more lasix today     Chronic hfpef  - seems relatively euvolemic. Wheezing on exam and was given IVF yesterday. Bnp elevated. May redose  lasix again today     Dm- ssi, accuchecks     Ckd3- Cr stable, trend     Htn/hl- hold arb for now, give coreg. hold statin while on paxlovid     SCDs, lovenox    Possible dc today pending further cards recs, o2 walk. Tomorrow would be reasonable as well given still some wheezing/rhonchi on exam.       Alejandra Atwood MD  [facility]  Pager: [phone]

## 2024-06-18 NOTE — PLAN OF CARE
Patient Aox4. Colombian speaking. QID accu. , 2L NC. Baseline room air. Cardiac diet. Voids, uses urinal. IV Rocephin. PO Paxlovid. Standby with walker. Call light within reach. Family at bedside.     Problem: Patient/Family Goals  Goal: Patient/Family Long Term Goal  Description: Patient's Long Term Goal: Discharge home    Interventions:  - See additional Care Plan goals for specific interventions  Outcome: Progressing  Goal: Patient/Family Short Term Goal  Description: Patient's Short Term Goal:   6/17 NOC: Control cough    Interventions:   -PRN meds  - See additional Care Plan goals for specific interventions  Outcome: Progressing     Problem: RESPIRATORY - ADULT  Goal: Achieves optimal ventilation and oxygenation  Description: INTERVENTIONS:  - Assess for changes in respiratory status  - Assess for changes in mentation and behavior  - Position to facilitate oxygenation and minimize respiratory effort  - Oxygen supplementation based on oxygen saturation or ABGs  - Provide Smoking Cessation handout, if applicable  - Encourage broncho-pulmonary hygiene including cough, deep breathe, Incentive Spirometry  - Assess the need for suctioning and perform as needed  - Assess and instruct to report SOB or any respiratory difficulty  - Respiratory Therapy support as indicated  - Manage/alleviate anxiety  - Monitor for signs/symptoms of CO2 retention  Outcome: Progressing     Problem: SAFETY ADULT - FALL  Goal: Free from fall injury  Description: INTERVENTIONS:  - Assess pt frequently for physical needs  - Identify cognitive and physical deficits and behaviors that affect risk of falls.  - [place] fall precautions as indicated by assessment.  - Educate pt/family on patient safety including physical limitations  - Instruct pt to call for assistance with activity based on assessment  - Modify environment to reduce risk of injury  - Provide assistive devices as appropriate  - Consider OT/PT consult to assist with  strengthening/mobility  - Encourage toileting schedule  Outcome: Progressing      Problem: Diabetes/Glucose Control  Goal: Glucose maintained within prescribed range  Description: INTERVENTIONS:  - Monitor Blood Glucose as ordered  - Assess for signs and symptoms of hyperglycemia and hypoglycemia  - Administer ordered medications to maintain glucose within target range  - Assess barriers to adequate nutritional intake and initiate nutrition consult as needed  - Instruct patient on self management of diabetes  Outcome: Progressing

## 2024-06-18 NOTE — PROGRESS NOTES
SP02 % ON ROOM AIR AT REST 90%  SP02 % AMBULATION ON ROOM AIR 89%  SPO2% AMBULATION ON 02 94% ON  2 LITERS PER MINUTE

## 2024-06-19 VITALS
TEMPERATURE: 98 F | BODY MASS INDEX: 22 KG/M2 | DIASTOLIC BLOOD PRESSURE: 69 MMHG | SYSTOLIC BLOOD PRESSURE: 141 MMHG | HEART RATE: 66 BPM | WEIGHT: 154 LBS | OXYGEN SATURATION: 97 % | RESPIRATION RATE: 18 BRPM

## 2024-06-19 LAB
ANION GAP SERPL CALC-SCNC: 6 MMOL/L (ref 0–18)
BUN BLD-MCNC: 25 MG/DL (ref 9–23)
CALCIUM BLD-MCNC: 8.3 MG/DL (ref 8.5–10.1)
CHLORIDE SERPL-SCNC: 109 MMOL/L (ref 98–112)
CO2 SERPL-SCNC: 22 MMOL/L (ref 21–32)
CREAT BLD-MCNC: 1.11 MG/DL
EGFRCR SERPLBLD CKD-EPI 2021: 62 ML/MIN/1.73M2 (ref 60–?)
GLUCOSE BLD-MCNC: 127 MG/DL (ref 70–99)
GLUCOSE BLD-MCNC: 142 MG/DL (ref 70–99)
GLUCOSE BLD-MCNC: 175 MG/DL (ref 70–99)
OSMOLALITY SERPL CALC.SUM OF ELEC: 293 MOSM/KG (ref 275–295)
POTASSIUM SERPL-SCNC: 4.3 MMOL/L (ref 3.5–5.1)
SODIUM SERPL-SCNC: 137 MMOL/L (ref 136–145)

## 2024-06-19 PROCEDURE — 94640 AIRWAY INHALATION TREATMENT: CPT

## 2024-06-19 PROCEDURE — 82962 GLUCOSE BLOOD TEST: CPT

## 2024-06-19 PROCEDURE — 80048 BASIC METABOLIC PNL TOTAL CA: CPT | Performed by: HOSPITALIST

## 2024-06-19 RX ORDER — ATORVASTATIN CALCIUM 10 MG/1
10 TABLET, FILM COATED ORAL NIGHTLY
Qty: 30 TABLET | Refills: 2 | Status: SHIPPED | OUTPATIENT
Start: 2024-06-19

## 2024-06-19 RX ORDER — TAMSULOSIN HYDROCHLORIDE 0.4 MG/1
0.4 CAPSULE ORAL DAILY
Qty: 90 CAPSULE | Refills: 3 | Status: SHIPPED | OUTPATIENT
Start: 2024-06-19

## 2024-06-19 RX ORDER — IPRATROPIUM BROMIDE AND ALBUTEROL SULFATE 2.5; .5 MG/3ML; MG/3ML
3 SOLUTION RESPIRATORY (INHALATION) 2 TIMES DAILY
Qty: 60 ML | Refills: 0 | Status: SHIPPED | OUTPATIENT
Start: 2024-06-19 | End: 2024-06-29

## 2024-06-19 RX ORDER — CARVEDILOL 6.25 MG/1
6.25 TABLET ORAL 2 TIMES DAILY WITH MEALS
Qty: 180 TABLET | Refills: 3 | Status: SHIPPED | OUTPATIENT
Start: 2024-06-19 | End: 2025-06-14

## 2024-06-19 RX ORDER — CEFDINIR 300 MG/1
300 CAPSULE ORAL 2 TIMES DAILY
Qty: 8 CAPSULE | Refills: 0 | Status: SHIPPED | OUTPATIENT
Start: 2024-06-19 | End: 2024-06-23

## 2024-06-19 NOTE — PROGRESS NOTES
NURSING DISCHARGE NOTE     Discharged Home via Wheelchair.  Accompanied by Support staff  Belongings Taken by patient/family.    Patient is stable to discharge home. Medically cleared by all consults and hospitalist. Reviewed discharge instructions about medications and follow-up appointments with the patient. Patient verbalized understanding.  Questions and concerns addressed.  IV line dc'ed. Pressure and dressing applied. Clean/Dry/Intact. Discharge navigator completed.Tele box removed,cleaned and returned to drawer. All belongings sent with patient.    PO paxlovid given to pt to take him and finish per MD.

## 2024-06-19 NOTE — CM/SW NOTE
Informed by MD that patient's family would like HH. Sent referrals in aidin, await accepting HH provider. MD inquired about nebulizer, reviewed chart and no qualifying diagnosis. Sent message to MD to check.       HOME SITUATION  Type of Home: House   Home Layout: Two level  Stairs to Bedroom: 14  Railing: Yes     Lives With: Family (son and son's family)  Drives: No  Patient Owned Equipment: Cane     Prior Level of [place]: per pt/pt son reports, pt amb with cane without assist. Pt able to ascend 14 steps to 2nd floor bedroom without difficulty. Pt is not on home O2. Pt denies hx of falls.    (Per PT evaluation)    Anticipated therapy need: Home    Addendum 11am: Clarified dx of bronchitis with MD. Awaiting nebulizer and HH.     SW/CM to remain available for support and/or discharge planning.    12:15pm: Spoke with patient's son (Guzman) to discuss discharge planning. He inquired about [facility], explained that this looks like a caregiver agency which would provide private pay care giving services which they could reach out to but it is different than HH. Provided accepting HH providers, he is going to discuss and let SW know choice. Informed him nebulizer still pending. Discussed discharge today, he was agreeable.     12:20pm: Guzman called back, he spoke with his family and they decided on [facility]. Reserved in aidin.     Await MD to cosign nebulizer order.     2pm: Spoke with Susan from [facility], she will reach out to patient's family to coordinate delivery of nebulizer.      Nicole Lanza, [facility]  Discharge Planner  [phone]

## 2024-06-19 NOTE — DISCHARGE INSTRUCTIONS
Sometimes managing your health at home requires assistance.  The [facility] the [facility] team has recognized your preference to use [facility]. They can be reached by phone at [phone]. The fax number for your reference is [phone]. . A representative from the home health agency will contact you or your family to schedule your first visit.      [facility] - home nebulizer machine   Phone: [phone]  Fax: [phone]

## 2024-06-19 NOTE — PROGRESS NOTES
.[facility] Hospitalist note    PCP: Isa Galarza MD    Chief Complaint:  F/u covid    SUBJECTIVE:  Continuing to improve, able to maintain on RA  Feels some benefit from nebs    OBJECTIVE:  Temp:  [97.6 °F (36.4 °C)-98.5 °F (36.9 °C)] 97.6 °F (36.4 °C)  Pulse:  [57-82] 57  Resp:  [17-20] 18  BP: (124-152)/(54-77) 124/77  SpO2:  [93 %-99 %] 95 %    Intake/Output:  No intake or output data in the 24 hours ending 06/19/24 1105      Last 3 Weights   06/16/24 1852 154 lb (69.9 kg)   06/16/24 1523 154 lb (69.9 kg)   02/29/24 0923 143 lb (64.9 kg)   02/16/24 1523 152 lb (68.9 kg)       Exam  Gen: No acute distress  HEENT: anicteric sclera, MMM  Pulm: occ wheeze but clears when coughing  CV: Heart with regular rate and rhythm, no peripheral edema  Abd: Abdomen soft, nontender, nondistended, no organomegaly, bowel sounds present  MSK: Full range of motion in extremities, no clubbing, no cyanosis  Skin: no rashes or lesions  Neuro: A&OX3, no focal deficits    Data Review:         Labs:     Recent Labs   Lab 06/16/24  1540 06/17/24  0730 06/18/24  1008   WBC 9.1 5.7 4.4   HGB 10.6* 9.1* 9.3*   MCV 89.9 89.7 90.4   .0 131.0* 141.0*   NE 7.97* 4.68 3.13   LYMABS 0.49* 0.54* 0.66*       Recent Labs   Lab 06/16/24  1540 06/16/24  1843 06/17/24  0730 06/18/24  1008 06/18/24  1929 06/19/24  1011     --  138 140  --  137   K 4.5  --  4.1 3.5 4.8 4.3     --  113* 112  --  109   CO2 19.0*  --  17.0* 21.0  --  22.0   BUN 31*  --  33* 29*  --  25*   CREATSERUM 1.34* 1.27 1.13 1.14 1.14 1.11   CA 9.2  --  8.6 8.5  --  8.3*   *  --  131* 137*  --  175*       Recent Labs   Lab 06/16/24  1540   ALT 20   AST 15   ALB 3.8       Recent Labs   Lab 06/16/24  1540   PBNP 12,340*   PCT <0.05       No results for input(s): \"CRP\", \"VIVEK\", \"LDH\", \"DDIMER\" in the last 168 hours.    Recent Labs   Lab 06/18/24  0525 06/18/24  1134 06/18/24  1632 06/18/24  2124 06/19/24  0525   PGLU 132* 124* 219* 221* 127*       Meds:   Scheduled  Medication:   melatonin  5 mg Oral Nightly    carvedilol  6.25 mg Oral BID with meals    ipratropium-albuterol  3 mL Nebulization BID    nirmatrelvir-ritonavir  2 tablet Oral BID    enoxaparin  40 mg Subcutaneous Daily    cefTRIAXone  1 g Intravenous Q24H    acidophilus  1 each Oral Daily    insulin aspart  1-5 Units Subcutaneous TID AC and HS    aspirin  81 mg Oral Daily    cholecalciferol  1,000 Units Oral Daily    [Held by provider] tamsulosin  0.4 mg Oral Daily     Continuous Infusing Medication:  PRN Medication:  acetaminophen    polyethylene glycol (PEG 3350)    sennosides    bisacodyl    fleet enema    ondansetron    metoclopramide    glucose **OR** glucose **OR** glucose-vitamin C **OR** dextrose **OR** glucose **OR** glucose **OR** glucose-vitamin C       Microbiology:    Hospital Encounter on 06/16/24   1. Blood Culture     Status: None (Preliminary result)    Collection Time: 06/16/24  4:43 PM    Specimen: Blood,peripheral   Result Value Ref Range    Blood Culture Result No Growth 2 Days N/A       Lab Results   Component Value Date    COVID19 Detected (A) 06/16/2024        Assessment/Plan:   89 yo man with h/o chfpef, dm, ckd3, htn who presented with sob, cough, weakness. +covid and ?pna (seems less likely), also noted to have irregular cardiac rhythm.      Dyspnea/cough/weakness  - likely 2/2 +covid  +/- PNA causing acute bronchitis  - paxlovid, complete course  - procal neg, blood cultures ntd. Completed azithro. Plan on completing a couple more days of cephalosporin abx  - Pt needs nebs on discharge for continued treatment of acute bronchitis, will ask SW if pt can be set up with nebulizer machine.      Elevated trop/bnp  Arrhythmia, ?PACs v block, possibly reactive to lung issues  - echo done recently  - cards consult, discussed with Dr. Hui  - will stop trop checks, pt does not want aggressive mgmt for this  - reduced coreg dose as this could be contributing to av block- seems to have helped  rhythm  - s/p 2 doses iv lasix     Chronic hfpef  - seems relatively euvolemic. Wheezing improved. Had received a couple of doses of iv lasix also     Dm- ssi, accuchecks     Ckd3- Cr stable, trend     Htn/hl- hold arb for now, give coreg. hold statin while on paxlovid     SCDs, lovenox    Planning dc today. SW helping with arrangements, possible HH      Alejandra Atwood MD  [facility]  Pager: [phone]

## 2024-06-19 NOTE — PLAN OF CARE
Patient Aox4. Uruguayan speaking. QID accu. , Room air. Baseline room air. Cardiac diet. Voids, uses urinal. IV Rocephin. PO Paxlovid. Standby with walker. Call light within reach. Family at bedside.     Problem: Patient/Family Goals  Goal: Patient/Family Long Term Goal  Description: Patient's Long Term Goal: Discharge home    Interventions:  - See additional Care Plan goals for specific interventions  Outcome: Progressing  Goal: Patient/Family Short Term Goal  Description: Patient's Short Term Goal:   6/17 NOC: Control cough    Interventions:   -PRN meds  - See additional Care Plan goals for specific interventions  Outcome: Progressing     Problem: RESPIRATORY - ADULT  Goal: Achieves optimal ventilation and oxygenation  Description: INTERVENTIONS:  - Assess for changes in respiratory status  - Assess for changes in mentation and behavior  - Position to facilitate oxygenation and minimize respiratory effort  - Oxygen supplementation based on oxygen saturation or ABGs  - Provide Smoking Cessation handout, if applicable  - Encourage broncho-pulmonary hygiene including cough, deep breathe, Incentive Spirometry  - Assess the need for suctioning and perform as needed  - Assess and instruct to report SOB or any respiratory difficulty  - Respiratory Therapy support as indicated  - Manage/alleviate anxiety  - Monitor for signs/symptoms of CO2 retention  Outcome: Progressing     Problem: SAFETY ADULT - FALL  Goal: Free from fall injury  Description: INTERVENTIONS:  - Assess pt frequently for physical needs  - Identify cognitive and physical deficits and behaviors that affect risk of falls.  - [place] fall precautions as indicated by assessment.  - Educate pt/family on patient safety including physical limitations  - Instruct pt to call for assistance with activity based on assessment  - Modify environment to reduce risk of injury  - Provide assistive devices as appropriate  - Consider OT/PT consult to assist with  strengthening/mobility  - Encourage toileting schedule  Outcome: Progressing      Problem: Diabetes/Glucose Control  Goal: Glucose maintained within prescribed range  Description: INTERVENTIONS:  - Monitor Blood Glucose as ordered  - Assess for signs and symptoms of hyperglycemia and hypoglycemia  - Administer ordered medications to maintain glucose within target range  - Assess barriers to adequate nutritional intake and initiate nutrition consult as needed  - Instruct patient on self management of diabetes  Outcome: Progressing

## 2024-06-19 NOTE — PLAN OF CARE
Pt is a&ox4. Glasses. Primarily Chinese speaking. Bilateral HA. RA. NSR on tele. PO paxlovid. IV abx. Lovenox. Voids per BRP, up SBA. Tolerating diet w/ QID accuchecks. Pt and family updated on poc. No further needs at this time.    Problem: Patient/Family Goals  Goal: Patient/Family Long Term Goal  Description: Patient's Long Term Goal: Discharge home    Interventions:  - See additional Care Plan goals for specific interventions  Outcome: Progressing  Goal: Patient/Family Short Term Goal  Description: Patient's Short Term Goal:   6/17 NOC: Control cough    Interventions:   -PRN meds  - See additional Care Plan goals for specific interventions  Outcome: Progressing

## 2024-06-20 ENCOUNTER — PATIENT OUTREACH (OUTPATIENT)
Dept: CASE MANAGEMENT | Age: 89
End: 2024-06-20

## 2024-06-20 NOTE — PROGRESS NOTES
Hospital follow up.    Olaf Hui MD  Cardiology, Structural Heart  [address]  [address]  [place] [address] [phone]  2-3 weeks    Patient's son requested a call back at another time.

## 2024-06-21 NOTE — PROGRESS NOTES
Hospital follow up.    Olaf Hui MD  Cardiology, Structural Heart  [address]   [address]  [place] [address] [phone]  2-3 weeks    Patient's son ended the call.    Closing encounter.

## 2024-07-10 ENCOUNTER — LAB REQUISITION (OUTPATIENT)
Dept: LAB | Age: 89
End: 2024-07-10

## 2024-07-10 DIAGNOSIS — E11.9 TYPE 2 DIABETES MELLITUS WITHOUT COMPLICATIONS  (CMD): ICD-10-CM

## 2024-07-10 LAB
ANION GAP SERPL CALC-SCNC: 12 MMOL/L (ref 7–19)
BASOPHILS # BLD: 0 K/MCL (ref 0–0.3)
BASOPHILS NFR BLD: 1 %
BUN SERPL-MCNC: 35 MG/DL (ref 6–20)
BUN/CREAT SERPL: 31 (ref 7–25)
CALCIUM SERPL-MCNC: 9.1 MG/DL (ref 8.4–10.2)
CHLORIDE SERPL-SCNC: 110 MMOL/L (ref 97–110)
CO2 SERPL-SCNC: 22 MMOL/L (ref 21–32)
CREAT SERPL-MCNC: 1.12 MG/DL (ref 0.67–1.17)
DEPRECATED RDW RBC: 48.7 FL (ref 39–50)
EGFRCR SERPLBLD CKD-EPI 2021: 61 ML/MIN/{1.73_M2}
EOSINOPHIL # BLD: 0.2 K/MCL (ref 0–0.5)
EOSINOPHIL NFR BLD: 2 %
ERYTHROCYTE [DISTWIDTH] IN BLOOD: 14.1 % (ref 11–15)
FASTING DURATION TIME PATIENT: ABNORMAL H
GLUCOSE SERPL-MCNC: 110 MG/DL (ref 70–99)
HCT VFR BLD CALC: 34 % (ref 39–51)
HGB BLD-MCNC: 10.5 G/DL (ref 13–17)
IMM GRANULOCYTES # BLD AUTO: 0 K/MCL (ref 0–0.2)
IMM GRANULOCYTES # BLD: 0 %
LYMPHOCYTES # BLD: 1.5 K/MCL (ref 1–4)
LYMPHOCYTES NFR BLD: 23 %
MCH RBC QN AUTO: 29.2 PG (ref 26–34)
MCHC RBC AUTO-ENTMCNC: 30.9 G/DL (ref 32–36.5)
MCV RBC AUTO: 94.4 FL (ref 78–100)
MONOCYTES # BLD: 0.6 K/MCL (ref 0.3–0.9)
MONOCYTES NFR BLD: 9 %
NEUTROPHILS # BLD: 4.5 K/MCL (ref 1.8–7.7)
NEUTROPHILS NFR BLD: 65 %
NRBC BLD MANUAL-RTO: 0 /100 WBC
PLATELET # BLD AUTO: 190 K/MCL (ref 140–450)
POTASSIUM SERPL-SCNC: 4.7 MMOL/L (ref 3.4–5.1)
RBC # BLD: 3.6 MIL/MCL (ref 4.5–5.9)
SODIUM SERPL-SCNC: 139 MMOL/L (ref 135–145)
WBC # BLD: 6.8 K/MCL (ref 4.2–11)

## 2024-07-10 PROCEDURE — 80048 BASIC METABOLIC PNL TOTAL CA: CPT | Performed by: CLINICAL MEDICAL LABORATORY

## 2024-07-10 PROCEDURE — 85025 COMPLETE CBC W/AUTO DIFF WBC: CPT | Performed by: CLINICAL MEDICAL LABORATORY

## 2024-07-11 LAB — RAINBOW EXTRA TUBES HOLD SPECIMEN: NORMAL

## (undated) DEVICE — BSS BAG CENTURION

## (undated) DEVICE — PREP BETADINE SOL 5% EYE

## (undated) DEVICE — UNFOLDER PLATINUM 1 SERIES CRTRDG 30/BOX: Brand: UNFOLDER PLATINUM 1 SERIES

## (undated) DEVICE — CATARACT PATIENT CARE KIT

## (undated) DEVICE — EYE PACK: Brand: MEDLINE INDUSTRIES, INC.

## (undated) DEVICE — SINGLE USE MEDICAL DEVICE FOR OPHTHALMIC SURGERY: Brand: SIL. COATED I/A 45 MIL 12/B

## (undated) DEVICE — GAMMEX® PI HYBRID SIZE 7.5, STERILE POWDER-FREE SURGICAL GLOVE, POLYISOPRENE AND NEOPRENE BLEND: Brand: GAMMEX

## (undated) DEVICE — BLADE ADVANCUT CORNEAL 2.4MM

## (undated) DEVICE — ACTIVE FMS W/ INTREPID* ULTRA SLEEVES, 0.9MM 30° ABS* INTREPID* BALANCED TIP: Brand: ALCON

## (undated) DEVICE — SOL H2O 1000ML BTL

## (undated) DEVICE — CLEARCUT® SIDEPORT KNIFE DUAL BEVEL 1.0MM ANGLED: Brand: CLEARCUT®

## (undated) NOTE — LETTER
DECLINATION FORM    [address] [facility] provides interpreters for patients who are deaf, hearing impaired, or have Limited [place] Proficiency in order to ensure thee patients an equal opportunity to benefit from services.  There Printed: @DATE      Medical Record #: [id number]   Revised (9/4/03)

## (undated) NOTE — LETTER
[facility]  Phone: [phone]  Right Fax: [phone]  [facility] 20 By:  Renetta Wan RN Date: 2/6/19    Patient Name: German Sanders  Surgery Date: 2/14/2019    CSN: [id number]  Medical Record: [id number]